# Patient Record
Sex: FEMALE | Race: WHITE | NOT HISPANIC OR LATINO | Employment: FULL TIME | URBAN - METROPOLITAN AREA
[De-identification: names, ages, dates, MRNs, and addresses within clinical notes are randomized per-mention and may not be internally consistent; named-entity substitution may affect disease eponyms.]

---

## 2017-11-21 ENCOUNTER — CONVERSION ENCOUNTER (OUTPATIENT)
Dept: GENERAL RADIOLOGY | Facility: HOSPITAL | Age: 62
End: 2017-11-21

## 2019-07-11 ENCOUNTER — HOSPITAL ENCOUNTER (OUTPATIENT)
Dept: LAB | Facility: HOSPITAL | Age: 64
Discharge: HOME | End: 2019-07-11
Attending: INTERNAL MEDICINE

## 2019-07-11 LAB
25(OH)D3 SERPL-MCNC: 27.4 NG/ML (ref 30–100)
ALBUMIN SERPL-MCNC: 4.1 G/DL (ref 3.5–5)
ALBUMIN/GLOB SERPL: 1.5 {RATIO} (ref 1.4–2.6)
ALP SERPL-CCNC: 62 U/L (ref 43–160)
ALT SERPL-CCNC: 18 U/L (ref 10–40)
ANION GAP SERPL CALC-SCNC: 14 MMOL/L (ref 8–19)
AST SERPL-CCNC: 23 U/L (ref 15–50)
BASOPHILS # BLD AUTO: 0.05 10*3/UL (ref 0–0.2)
BASOPHILS NFR BLD AUTO: 1.1 % (ref 0–3)
BILIRUB SERPL-MCNC: 0.37 MG/DL (ref 0.2–1.3)
BUN SERPL-MCNC: 21 MG/DL (ref 5–25)
BUN/CREAT SERPL: 22 {RATIO} (ref 6–20)
CALCIUM SERPL-MCNC: 9.1 MG/DL (ref 8.7–10.4)
CHLORIDE SERPL-SCNC: 101 MMOL/L (ref 99–111)
CHOLEST SERPL-MCNC: 236 MG/DL (ref 107–200)
CHOLEST/HDLC SERPL: 3.2 {RATIO} (ref 3–6)
CONV ABS IMM GRAN: 0 10*3/UL (ref 0–0.2)
CONV CO2: 27 MMOL/L (ref 22–32)
CONV IMMATURE GRAN: 0 % (ref 0–1.8)
CONV TOTAL PROTEIN: 6.8 G/DL (ref 6.3–8.2)
CREAT UR-MCNC: 0.94 MG/DL (ref 0.5–0.9)
DEPRECATED RDW RBC AUTO: 46.1 FL (ref 36.4–46.3)
EOSINOPHIL # BLD AUTO: 0.13 10*3/UL (ref 0–0.7)
EOSINOPHIL # BLD AUTO: 2.8 % (ref 0–7)
ERYTHROCYTE [DISTWIDTH] IN BLOOD BY AUTOMATED COUNT: 13.5 % (ref 11.7–14.4)
GFR SERPLBLD BASED ON 1.73 SQ M-ARVRAT: >60 ML/MIN/{1.73_M2}
GLOBULIN UR ELPH-MCNC: 2.7 G/DL (ref 2–3.5)
GLUCOSE SERPL-MCNC: 84 MG/DL (ref 65–99)
HBA1C MFR BLD: 10.9 G/DL (ref 12–16)
HCT VFR BLD AUTO: 34.5 % (ref 37–47)
HDLC SERPL-MCNC: 74 MG/DL (ref 40–60)
LDLC SERPL CALC-MCNC: 153 MG/DL (ref 70–100)
LYMPHOCYTES # BLD AUTO: 1.92 10*3/UL (ref 1–5)
MCH RBC QN AUTO: 29 PG (ref 27–31)
MCHC RBC AUTO-ENTMCNC: 31.6 G/DL (ref 33–37)
MCV RBC AUTO: 91.8 FL (ref 81–99)
MONOCYTES # BLD AUTO: 0.34 10*3/UL (ref 0.2–1.2)
MONOCYTES NFR BLD AUTO: 7.3 % (ref 3–10)
NEUTROPHILS # BLD AUTO: 2.21 10*3/UL (ref 2–8)
NEUTROPHILS NFR BLD AUTO: 47.5 % (ref 30–85)
NRBC CBCN: 0 % (ref 0–0.7)
OSMOLALITY SERPL CALC.SUM OF ELEC: 288 MOSM/KG (ref 273–304)
PLATELET # BLD AUTO: 272 10*3/UL (ref 130–400)
PMV BLD AUTO: 10.6 FL (ref 9.4–12.3)
POTASSIUM SERPL-SCNC: 4.4 MMOL/L (ref 3.5–5.3)
RBC # BLD AUTO: 3.76 10*6/UL (ref 4.2–5.4)
SODIUM SERPL-SCNC: 138 MMOL/L (ref 135–147)
T4 FREE SERPL-MCNC: 1.2 NG/DL (ref 0.9–1.8)
TRIGL SERPL-MCNC: 45 MG/DL (ref 40–150)
TSH SERPL-ACNC: 4.12 M[IU]/L (ref 0.27–4.2)
VARIANT LYMPHS NFR BLD MANUAL: 41.3 % (ref 20–45)
VLDLC SERPL-MCNC: 9 MG/DL (ref 5–37)
WBC # BLD AUTO: 4.65 10*3/UL (ref 4.8–10.8)

## 2019-09-12 ENCOUNTER — HOSPITAL ENCOUNTER (OUTPATIENT)
Dept: MAMMOGRAPHY | Facility: HOSPITAL | Age: 64
Discharge: HOME | End: 2019-09-12
Attending: INTERNAL MEDICINE

## 2019-11-11 ENCOUNTER — HOSPITAL ENCOUNTER (OUTPATIENT)
Dept: LAB | Facility: HOSPITAL | Age: 64
Discharge: HOME | End: 2019-11-11
Attending: INTERNAL MEDICINE

## 2019-11-11 LAB
ALBUMIN SERPL-MCNC: 4.2 G/DL (ref 3.5–5)
ALBUMIN/GLOB SERPL: 1.6 {RATIO} (ref 1.4–2.6)
ALP SERPL-CCNC: 58 U/L (ref 43–160)
ALT SERPL-CCNC: 22 U/L (ref 10–40)
ANION GAP SERPL CALC-SCNC: 17 MMOL/L (ref 8–19)
AST SERPL-CCNC: 24 U/L (ref 15–50)
BILIRUB SERPL-MCNC: 0.18 MG/DL (ref 0.2–1.3)
BUN SERPL-MCNC: 17 MG/DL (ref 5–25)
BUN/CREAT SERPL: 20 {RATIO} (ref 6–20)
CALCIUM SERPL-MCNC: 9.4 MG/DL (ref 8.7–10.4)
CHLORIDE SERPL-SCNC: 102 MMOL/L (ref 99–111)
CHOLEST SERPL-MCNC: 179 MG/DL (ref 107–200)
CHOLEST/HDLC SERPL: 2.3 {RATIO} (ref 3–6)
CK SERPL-CCNC: 56 U/L (ref 35–230)
CONV CO2: 25 MMOL/L (ref 22–32)
CONV TOTAL PROTEIN: 6.8 G/DL (ref 6.3–8.2)
CREAT UR-MCNC: 0.86 MG/DL (ref 0.5–0.9)
GFR SERPLBLD BASED ON 1.73 SQ M-ARVRAT: >60 ML/MIN/{1.73_M2}
GLOBULIN UR ELPH-MCNC: 2.6 G/DL (ref 2–3.5)
GLUCOSE SERPL-MCNC: 87 MG/DL (ref 65–99)
HDLC SERPL-MCNC: 77 MG/DL (ref 40–60)
LDLC SERPL CALC-MCNC: 92 MG/DL (ref 70–100)
OSMOLALITY SERPL CALC.SUM OF ELEC: 291 MOSM/KG (ref 273–304)
POTASSIUM SERPL-SCNC: 4.3 MMOL/L (ref 3.5–5.3)
SODIUM SERPL-SCNC: 140 MMOL/L (ref 135–147)
TRIGL SERPL-MCNC: 51 MG/DL (ref 40–150)
VLDLC SERPL-MCNC: 10 MG/DL (ref 5–37)

## 2020-06-06 ENCOUNTER — HOSPITAL ENCOUNTER (OUTPATIENT)
Dept: URGENT CARE | Facility: CLINIC | Age: 65
Discharge: HOME | End: 2020-06-06
Attending: PHYSICIAN ASSISTANT

## 2020-06-26 ENCOUNTER — HOSPITAL ENCOUNTER (OUTPATIENT)
Dept: LAB | Facility: HOSPITAL | Age: 65
Discharge: HOME | End: 2020-06-26
Attending: INTERNAL MEDICINE

## 2020-06-26 LAB
25(OH)D3 SERPL-MCNC: 39.3 NG/ML (ref 30–100)
ANION GAP SERPL CALC-SCNC: 12 MMOL/L (ref 8–19)
BASOPHILS # BLD AUTO: 0.06 10*3/UL (ref 0–0.2)
BASOPHILS NFR BLD AUTO: 1.3 % (ref 0–3)
BUN SERPL-MCNC: 22 MG/DL (ref 5–25)
BUN/CREAT SERPL: 24 {RATIO} (ref 6–20)
CALCIUM SERPL-MCNC: 9.9 MG/DL (ref 8.7–10.4)
CHLORIDE SERPL-SCNC: 101 MMOL/L (ref 99–111)
CONV ABS IMM GRAN: 0.01 10*3/UL (ref 0–0.2)
CONV CO2: 29 MMOL/L (ref 22–32)
CONV IMMATURE GRAN: 0.2 % (ref 0–1.8)
CREAT UR-MCNC: 0.91 MG/DL (ref 0.5–0.9)
DEPRECATED RDW RBC AUTO: 42 FL (ref 36.4–46.3)
EOSINOPHIL # BLD AUTO: 0.17 10*3/UL (ref 0–0.7)
EOSINOPHIL # BLD AUTO: 3.8 % (ref 0–7)
ERYTHROCYTE [DISTWIDTH] IN BLOOD BY AUTOMATED COUNT: 12.5 % (ref 11.7–14.4)
GFR SERPLBLD BASED ON 1.73 SQ M-ARVRAT: >60 ML/MIN/{1.73_M2}
GLUCOSE SERPL-MCNC: 94 MG/DL (ref 65–99)
HCT VFR BLD AUTO: 37.5 % (ref 37–47)
HGB BLD-MCNC: 11.9 G/DL (ref 12–16)
LYMPHOCYTES # BLD AUTO: 2.15 10*3/UL (ref 1–5)
LYMPHOCYTES NFR BLD AUTO: 47.8 % (ref 20–45)
MCH RBC QN AUTO: 29.2 PG (ref 27–31)
MCHC RBC AUTO-ENTMCNC: 31.7 G/DL (ref 33–37)
MCV RBC AUTO: 92.1 FL (ref 81–99)
MONOCYTES # BLD AUTO: 0.34 10*3/UL (ref 0.2–1.2)
MONOCYTES NFR BLD AUTO: 7.6 % (ref 3–10)
NEUTROPHILS # BLD AUTO: 1.77 10*3/UL (ref 2–8)
NEUTROPHILS NFR BLD AUTO: 39.3 % (ref 30–85)
NRBC CBCN: 0 % (ref 0–0.7)
OSMOLALITY SERPL CALC.SUM OF ELEC: 287 MOSM/KG (ref 273–304)
PLATELET # BLD AUTO: 279 10*3/UL (ref 130–400)
PMV BLD AUTO: 10.7 FL (ref 9.4–12.3)
POTASSIUM SERPL-SCNC: 4.7 MMOL/L (ref 3.5–5.3)
RBC # BLD AUTO: 4.07 10*6/UL (ref 4.2–5.4)
SODIUM SERPL-SCNC: 137 MMOL/L (ref 135–147)
T4 FREE SERPL-MCNC: 1.1 NG/DL (ref 0.9–1.8)
TSH SERPL-ACNC: 3.24 M[IU]/L (ref 0.27–4.2)
WBC # BLD AUTO: 4.5 10*3/UL (ref 4.8–10.8)

## 2020-07-20 ENCOUNTER — OFFICE VISIT CONVERTED (OUTPATIENT)
Dept: ORTHOPEDIC SURGERY | Facility: CLINIC | Age: 65
End: 2020-07-20
Attending: ORTHOPAEDIC SURGERY

## 2020-08-07 ENCOUNTER — HOSPITAL ENCOUNTER (OUTPATIENT)
Dept: MRI IMAGING | Facility: HOSPITAL | Age: 65
Discharge: HOME | End: 2020-08-07
Attending: ORTHOPAEDIC SURGERY

## 2020-08-14 ENCOUNTER — OFFICE VISIT CONVERTED (OUTPATIENT)
Dept: ORTHOPEDIC SURGERY | Facility: CLINIC | Age: 65
End: 2020-08-14
Attending: ORTHOPAEDIC SURGERY

## 2020-09-15 ENCOUNTER — HOSPITAL ENCOUNTER (OUTPATIENT)
Dept: MAMMOGRAPHY | Facility: HOSPITAL | Age: 65
Discharge: HOME | End: 2020-09-15
Attending: INTERNAL MEDICINE

## 2020-11-04 ENCOUNTER — HOSPITAL ENCOUNTER (OUTPATIENT)
Dept: OTHER | Facility: HOSPITAL | Age: 65
Discharge: HOME | End: 2020-11-04
Attending: NURSE PRACTITIONER

## 2020-11-06 LAB
HBV SURFACE AB SER QL: NON REACTIVE
MEV IGG SER IA-ACNC: >300 AU/ML

## 2020-11-07 LAB — MEV IGG SER IA-ACNC: >300 AU/ML

## 2020-12-28 ENCOUNTER — HOSPITAL ENCOUNTER (OUTPATIENT)
Dept: URGENT CARE | Facility: CLINIC | Age: 65
Discharge: HOME | End: 2020-12-28
Attending: PHYSICIAN ASSISTANT

## 2020-12-30 ENCOUNTER — HOSPITAL ENCOUNTER (OUTPATIENT)
Dept: LAB | Facility: HOSPITAL | Age: 65
Discharge: HOME | End: 2020-12-30
Attending: INTERNAL MEDICINE

## 2020-12-30 LAB
ALBUMIN SERPL-MCNC: 4.5 G/DL (ref 3.5–5)
ALBUMIN/GLOB SERPL: 1.7 {RATIO} (ref 1.4–2.6)
ALP SERPL-CCNC: 66 U/L (ref 43–160)
ALT SERPL-CCNC: 18 U/L (ref 10–40)
ANION GAP SERPL CALC-SCNC: 17 MMOL/L (ref 8–19)
AST SERPL-CCNC: 23 U/L (ref 15–50)
BASOPHILS # BLD AUTO: 0.05 10*3/UL (ref 0–0.2)
BASOPHILS NFR BLD AUTO: 1.1 % (ref 0–3)
BILIRUB SERPL-MCNC: 0.32 MG/DL (ref 0.2–1.3)
BUN SERPL-MCNC: 13 MG/DL (ref 5–25)
BUN/CREAT SERPL: 15 {RATIO} (ref 6–20)
CALCIUM SERPL-MCNC: 9.6 MG/DL (ref 8.7–10.4)
CHLORIDE SERPL-SCNC: 101 MMOL/L (ref 99–111)
CHOLEST SERPL-MCNC: 228 MG/DL (ref 107–200)
CHOLEST/HDLC SERPL: 3.3 {RATIO} (ref 3–6)
CONV ABS IMM GRAN: 0.01 10*3/UL (ref 0–0.2)
CONV CO2: 25 MMOL/L (ref 22–32)
CONV IMMATURE GRAN: 0.2 % (ref 0–1.8)
CONV TOTAL PROTEIN: 7.1 G/DL (ref 6.3–8.2)
CREAT UR-MCNC: 0.88 MG/DL (ref 0.5–0.9)
DEPRECATED RDW RBC AUTO: 42.9 FL (ref 36.4–46.3)
EOSINOPHIL # BLD AUTO: 0.11 10*3/UL (ref 0–0.7)
EOSINOPHIL # BLD AUTO: 2.4 % (ref 0–7)
ERYTHROCYTE [DISTWIDTH] IN BLOOD BY AUTOMATED COUNT: 12.7 % (ref 11.7–14.4)
GFR SERPLBLD BASED ON 1.73 SQ M-ARVRAT: >60 ML/MIN/{1.73_M2}
GLOBULIN UR ELPH-MCNC: 2.6 G/DL (ref 2–3.5)
GLUCOSE SERPL-MCNC: 94 MG/DL (ref 65–99)
HCT VFR BLD AUTO: 38.9 % (ref 37–47)
HDLC SERPL-MCNC: 70 MG/DL (ref 40–60)
HGB BLD-MCNC: 12.3 G/DL (ref 12–16)
LDLC SERPL CALC-MCNC: 133 MG/DL (ref 70–100)
LYMPHOCYTES # BLD AUTO: 1.43 10*3/UL (ref 1–5)
LYMPHOCYTES NFR BLD AUTO: 31.6 % (ref 20–45)
MCH RBC QN AUTO: 29.1 PG (ref 27–31)
MCHC RBC AUTO-ENTMCNC: 31.6 G/DL (ref 33–37)
MCV RBC AUTO: 92.2 FL (ref 81–99)
MONOCYTES # BLD AUTO: 0.29 10*3/UL (ref 0.2–1.2)
MONOCYTES NFR BLD AUTO: 6.4 % (ref 3–10)
NEUTROPHILS # BLD AUTO: 2.63 10*3/UL (ref 2–8)
NEUTROPHILS NFR BLD AUTO: 58.3 % (ref 30–85)
NRBC CBCN: 0 % (ref 0–0.7)
OSMOLALITY SERPL CALC.SUM OF ELEC: 288 MOSM/KG (ref 273–304)
PLATELET # BLD AUTO: 266 10*3/UL (ref 130–400)
PMV BLD AUTO: 10.8 FL (ref 9.4–12.3)
POTASSIUM SERPL-SCNC: 4.2 MMOL/L (ref 3.5–5.3)
RBC # BLD AUTO: 4.22 10*6/UL (ref 4.2–5.4)
SODIUM SERPL-SCNC: 139 MMOL/L (ref 135–147)
TRIGL SERPL-MCNC: 126 MG/DL (ref 40–150)
VLDLC SERPL-MCNC: 25 MG/DL (ref 5–37)
WBC # BLD AUTO: 4.52 10*3/UL (ref 4.8–10.8)

## 2021-02-25 ENCOUNTER — HOSPITAL ENCOUNTER (OUTPATIENT)
Dept: OTHER | Facility: HOSPITAL | Age: 66
Discharge: HOME | End: 2021-02-25

## 2021-02-25 LAB
ALBUMIN SERPL-MCNC: 4.3 G/DL (ref 3.5–5)
ALBUMIN/GLOB SERPL: 1.5 {RATIO} (ref 1.4–2.6)
ALP SERPL-CCNC: 79 U/L (ref 43–160)
ALT SERPL-CCNC: 14 U/L (ref 10–40)
ANION GAP SERPL CALC-SCNC: 11 MMOL/L (ref 8–19)
AST SERPL-CCNC: 21 U/L (ref 15–50)
BASOPHILS # BLD AUTO: 0.02 10*3/UL (ref 0–0.2)
BASOPHILS NFR BLD AUTO: 0.5 % (ref 0–3)
BILIRUB SERPL-MCNC: 0.29 MG/DL (ref 0.2–1.3)
BUN SERPL-MCNC: 13 MG/DL (ref 5–25)
BUN/CREAT SERPL: 14 {RATIO} (ref 6–20)
CALCIUM SERPL-MCNC: 8.9 MG/DL (ref 8.7–10.4)
CHLORIDE SERPL-SCNC: 100 MMOL/L (ref 99–111)
CHOLEST SERPL-MCNC: 211 MG/DL (ref 107–200)
CHOLEST/HDLC SERPL: 3.6 {RATIO} (ref 3–6)
CONV ABS IMM GRAN: 0 10*3/UL (ref 0–0.2)
CONV CO2: 26 MMOL/L (ref 22–32)
CONV IMMATURE GRAN: 0 % (ref 0–1.8)
CONV TOTAL PROTEIN: 7.2 G/DL (ref 6.3–8.2)
CREAT UR-MCNC: 0.91 MG/DL (ref 0.5–0.9)
DEPRECATED RDW RBC AUTO: 45.2 FL (ref 36.4–46.3)
EOSINOPHIL # BLD AUTO: 0.01 10*3/UL (ref 0–0.7)
EOSINOPHIL # BLD AUTO: 0.2 % (ref 0–7)
ERYTHROCYTE [DISTWIDTH] IN BLOOD BY AUTOMATED COUNT: 13.4 % (ref 11.7–14.4)
GFR SERPLBLD BASED ON 1.73 SQ M-ARVRAT: >60 ML/MIN/{1.73_M2}
GLOBULIN UR ELPH-MCNC: 2.9 G/DL (ref 2–3.5)
GLUCOSE SERPL-MCNC: 93 MG/DL (ref 65–99)
HCT VFR BLD AUTO: 38.8 % (ref 37–47)
HDLC SERPL-MCNC: 59 MG/DL (ref 40–60)
HGB BLD-MCNC: 12.5 G/DL (ref 12–16)
LDLC SERPL CALC-MCNC: 136 MG/DL (ref 70–100)
LYMPHOCYTES # BLD AUTO: 1.74 10*3/UL (ref 1–5)
LYMPHOCYTES NFR BLD AUTO: 42.6 % (ref 20–45)
MCH RBC QN AUTO: 29.3 PG (ref 27–31)
MCHC RBC AUTO-ENTMCNC: 32.2 G/DL (ref 33–37)
MCV RBC AUTO: 91.1 FL (ref 81–99)
MONOCYTES # BLD AUTO: 0.37 10*3/UL (ref 0.2–1.2)
MONOCYTES NFR BLD AUTO: 9.1 % (ref 3–10)
NEUTROPHILS # BLD AUTO: 1.94 10*3/UL (ref 2–8)
NEUTROPHILS NFR BLD AUTO: 47.6 % (ref 30–85)
NRBC CBCN: 0 % (ref 0–0.7)
OSMOLALITY SERPL CALC.SUM OF ELEC: 276 MOSM/KG (ref 273–304)
PLATELET # BLD AUTO: 191 10*3/UL (ref 130–400)
PMV BLD AUTO: 9.7 FL (ref 9.4–12.3)
POTASSIUM SERPL-SCNC: 4.1 MMOL/L (ref 3.5–5.3)
RBC # BLD AUTO: 4.26 10*6/UL (ref 4.2–5.4)
SODIUM SERPL-SCNC: 133 MMOL/L (ref 135–147)
TRIGL SERPL-MCNC: 78 MG/DL (ref 40–150)
TSH SERPL-ACNC: 2.88 M[IU]/L (ref 0.27–4.2)
VLDLC SERPL-MCNC: 16 MG/DL (ref 5–37)
WBC # BLD AUTO: 4.08 10*3/UL (ref 4.8–10.8)

## 2021-05-10 NOTE — H&P
History and Physical      Patient Name: Giselle Garcia   Patient ID: [id number]   Sex: Female   YOB: 1955    Referring Provider: Juno Jerome MD    Visit Date: July 20, 2020    Provider: Sebastián Gonzalez MD   Location: [facility]   Location Address: [address] [place] [address]  [id number]   Location Phone: [phone]          Chief Complaint  · Left knee pain      History Of Present Illness  Giselle Garcia is a 65 year old /White female who presents today to [facility]. Patient is here for left knee pain that started 3 months ago. No known injury or trauma. 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 She states her pain waxes and wanes. Patient states the pain is on the medial side.       Past Medical History  *No Pertinent Past Medical History; Limb Swelling         Past Surgical History  Colonoscopy; Tubal ligation         Medication List  Celexa oral; cetirizine oral; Imitrex 100 mg oral tablet; Lipitor oral; MoviPrep 100-7.5-2.691 gram oral powder in packet; multivitamin oral tablet; Vitamin B-12 500 mcg oral tablet; Vitamin D3 1,000 unit oral capsule         Allergy List  NO KNOWN DRUG ALLERGIES         Family Medical History  Heart Disease; - No Family History of Colorectal Cancer; Family history of Arthritis         Social History  Alcohol (Never); Alcohol Use (Current some day); lives with spouse; Recreational Drug Use (Never); Tobacco (Never); Working         Review of Systems  · Constitutional  o Denies  o : fever, chills, weight loss  · Cardiovascular  o Denies  o : chest pain, shortness of breath  · Gastrointestinal  o Denies  o : liver disease, heartburn, nausea, blood in stools  · Genitourinary  o Denies  o : painful urination, blood in urine  · Integument  o Denies  o : rash, itching  · Neurologic  o Denies  o : headache, weakness, loss of consciousness  · Musculoskeletal  o Denies  o : painful, swollen  "joints  · Psychiatric  o Denies  o : drug/alcohol addiction, anxiety, depression      Vitals  Date Time BP Position Site L\R Cuff Size HR RR TEMP (F) WT  HT  BMI kg/m2 BSA m2 O2 Sat        07/20/2020 09:18 AM         125lbs 0oz 5'  1\" 23.62 1.56           Physical Examination  · Constitutional  o Appearance  o : well developed, well-nourished, no obvious deformities present  · Head and Face  o Head  o :   § Inspection  § : normocephalic  o Face  o :   § Inspection  § : no facial lesions  · Eyes  o Conjunctivae  o : conjunctivae normal  o Sclerae  o : sclerae white  · Ears, Nose, Mouth and Throat  o Ears  o :   § External Ears  § : appearance within normal limits  § Hearing  § : intact  o Nose  o :   § External Nose  § : appearance normal  · Neck  o Inspection/Palpation  o : normal appearance  o Range of Motion  o : full range of motion  · Respiratory  o Respiratory Effort  o : breathing unlabored  o Inspection of Chest  o : normal appearance  o Auscultation of Lungs  o : no audible wheezing or rales  · Cardiovascular  o Heart  o : regular rate  · Gastrointestinal  o Abdominal Examination  o : soft and non-tender  · Skin and Subcutaneous Tissue  o General Inspection  o : intact, no rashes  · Psychiatric  o General  o : Alert and oriented x3  o Judgement and Insight  o : judgment and insight intact  o Mood and Affect  o : mood normal, affect appropriate  · Left Knee  o Inspection  o : No skin discoloration, atrophy or swelling. Full extension. Full flexion. No tenderness on medial and lateral joint line. Negative Lackmans. Negative valgus/varus. Calf supple, nontender. Neurovascularly intact. X-rays reviewed negative fracture or dislocation. Minimal osteoarthritis.           Assessment  · Left knee pain, unspecified chronicity     719.46/M25.562      Plan  · Medications  o Medications have been Reconciled  o Transition of Care or Provider Policy  · Instructions  o Reviewed the patient's Past Medical, Social, and " Family history as well as the ROS at today's visit, no changes.  o Call or return if worsening symptoms.  o This note is transcribed by Yanet rudd/carrie  o If pain increases, may consider a MRI.             Electronically Signed by: Yanet Gamez, -Author on July 21, 2020 03:49:20 PM  Electronically Co-signed by: Stella Parks PA-C -Reviewer on July 21, 2020 04:28:29 PM  Electronically Co-signed by: Sebastián Gonzalez MD -Reviewer on July 22, 2020 05:09:55 PM

## 2021-05-13 NOTE — PROGRESS NOTES
Progress Note      Patient Name: Giselle Gacria   Patient ID: [id number]   Sex: Female   YOB: 1955    Referring Provider: Juno Jerome MD    Visit Date: August 14, 2020    Provider: Sebastián Gonzalez MD   Location: [facility]   Location Address: [address] [place] [address]  [id number]   Location Phone: [phone]          Chief Complaint  · Follow up Left Knee Pain & MRI Results.      History Of Present Illness  Giselle Garcia is a 65 year old /White female who is following back up for the MRI results. She began having knee pain in May 2020. 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 The MRI shows a tiny possible tear in the medial meniscus posterior horn.       Past Medical History  *No Pertinent Past Medical History; Limb Swelling         Past Surgical History  Colonoscopy; Tubal ligation         Medication List  Celexa oral; cetirizine oral; Imitrex 100 mg oral tablet; Lipitor oral; MoviPrep 100-7.5-2.691 gram oral powder in packet; multivitamin oral tablet; Vitamin B-12 500 mcg oral tablet; Vitamin D3 1,000 unit oral capsule         Allergy List  NO KNOWN DRUG ALLERGIES         Family Medical History  Heart Disease; - No Family History of Colorectal Cancer; Family history of Arthritis         Social History  Alcohol (Never); Alcohol Use (Current some day); lives with spouse; Recreational Drug Use (Never); Tobacco (Never); Working         Review of Systems  · Constitutional  o Denies  o : fever, chills, weight loss  · Cardiovascular  o Denies  o : chest pain, shortness of breath  · Gastrointestinal  o Denies  o : liver disease, heartburn, nausea, blood in stools  · Genitourinary  o Denies  o : painful urination, blood in urine  · Integument  o Denies  o : rash, itching  · Neurologic  o Denies  o : headache, weakness, loss of consciousness  · Musculoskeletal  o Admits  o : painful, swollen joints  · Psychiatric  o Denies  o : drug/alcohol  "addiction, anxiety, depression      Vitals  Date Time BP Position Site L\R Cuff Size HR RR TEMP (F) WT  HT  BMI kg/m2 BSA m2 O2 Sat HC       08/14/2020 12:39 PM         125lbs 0oz 5'  1\" 23.62 1.56           Physical Examination  · Constitutional  o Appearance  o : well developed, well-nourished, no obvious deformities present  · Head and Face  o Head  o :   § Inspection  § : normocephalic  o Face  o :   § Inspection  § : no facial lesions  · Eyes  o Conjunctivae  o : conjunctivae normal  o Sclerae  o : sclerae white  · Ears, Nose, Mouth and Throat  o Ears  o :   § External Ears  § : appearance within normal limits  § Hearing  § : intact  o Nose  o :   § External Nose  § : appearance normal  · Neck  o Inspection/Palpation  o : normal appearance  o Range of Motion  o : full range of motion  · Respiratory  o Respiratory Effort  o : breathing unlabored  o Inspection of Chest  o : normal appearance  o Auscultation of Lungs  o : no audible wheezing or rales  · Cardiovascular  o Heart  o : regular rate  · Gastrointestinal  o Abdominal Examination  o : soft and non-tender  · Skin and Subcutaneous Tissue  o General Inspection  o : intact, no rashes  · Psychiatric  o General  o : Alert and oriented x3  o Judgement and Insight  o : judgment and insight intact  o Mood and Affect  o : mood normal, affect appropriate  · Left Knee  o Inspection  o : No effusion. Tenderness over posteromedial hamstrings and adductor tubercle. Minimally tender on the medial joint line. Negative Erlinda's. Negative Apley's. Full range of motion.   · Imaging  o Imaging  o : MRI performed at [facility] on 08/07/2020 revealed: 1) Tiny vertically-oriented radial tear in the posterior horn of medial meniscus; 2) Mild patellar chondromalacia. Abnormal signal in the quadriceps fat pad may be due to patellar tracking abnormality.           Assessment  · Left knee posteromedial hamstring pain     719.46/M25.562      Plan  · Medications  o Medications have been " Reconciled  o Transition of Care or Provider Policy  · Instructions  o Reviewed the patient's Past Medical, Social, and Family history as well as the ROS at today's visit, no changes.  o Call or return if worsening symptoms.  o This note was transcribed by Beronica butler.  o Proceed with physical therapy first to further evaluate and educate on home stretching exercise program. Physical therapy at [facility]. Follow up in 5-6 weeks. Consider possible injection if failing to improve.             Electronically Signed by: Beronica Gruber-, Other -Author on August 15, 2020 01:08:07 PM  Electronically Co-signed by: Sebastián Gonzalez MD -Reviewer on August 17, 2020 05:47:26 PM

## 2021-05-15 VITALS — BODY MASS INDEX: 23.6 KG/M2 | WEIGHT: 125 LBS | HEIGHT: 61 IN

## 2021-05-15 VITALS — HEIGHT: 61 IN | WEIGHT: 125 LBS | BODY MASS INDEX: 23.6 KG/M2

## 2021-06-14 ENCOUNTER — TRANSCRIBE ORDERS (OUTPATIENT)
Dept: LAB | Facility: HOSPITAL | Age: 66
End: 2021-06-14

## 2021-06-14 ENCOUNTER — LAB (OUTPATIENT)
Dept: LAB | Facility: HOSPITAL | Age: 66
End: 2021-06-14

## 2021-06-14 DIAGNOSIS — E55.9 VITAMIN D DEFICIENCY: ICD-10-CM

## 2021-06-14 DIAGNOSIS — R73.01 IMPAIRED FASTING GLUCOSE: ICD-10-CM

## 2021-06-14 DIAGNOSIS — D72.819 LEUKOPENIA, UNSPECIFIED TYPE: Primary | ICD-10-CM

## 2021-06-14 DIAGNOSIS — E03.4 IDIOPATHIC ATROPHIC HYPOTHYROIDISM: ICD-10-CM

## 2021-06-14 DIAGNOSIS — D72.819 LEUKOPENIA, UNSPECIFIED TYPE: ICD-10-CM

## 2021-06-14 DIAGNOSIS — D50.9 IRON DEFICIENCY ANEMIA, UNSPECIFIED IRON DEFICIENCY ANEMIA TYPE: ICD-10-CM

## 2021-06-14 DIAGNOSIS — E78.2 MIXED HYPERLIPIDEMIA: ICD-10-CM

## 2021-06-14 LAB
25(OH)D3 SERPL-MCNC: 37.6 NG/ML
ALBUMIN SERPL-MCNC: 4.3 G/DL (ref 3.5–5.2)
ALBUMIN/GLOB SERPL: 2.4 G/DL
ALP SERPL-CCNC: 57 U/L (ref 39–117)
ALT SERPL W P-5'-P-CCNC: 18 U/L (ref 1–33)
ANION GAP SERPL CALCULATED.3IONS-SCNC: 10.1 MMOL/L (ref 5–15)
AST SERPL-CCNC: 21 U/L (ref 1–32)
BASOPHILS # BLD AUTO: 0.04 10*3/MM3 (ref 0–0.2)
BASOPHILS NFR BLD AUTO: 0.7 % (ref 0–1.5)
BILIRUB SERPL-MCNC: <0.2 MG/DL (ref 0–1.2)
BUN SERPL-MCNC: 12 MG/DL (ref 8–23)
BUN/CREAT SERPL: 12.1 (ref 7–25)
CALCIUM SPEC-SCNC: 9.7 MG/DL (ref 8.6–10.5)
CHLORIDE SERPL-SCNC: 104 MMOL/L (ref 98–107)
CHOLEST SERPL-MCNC: 235 MG/DL (ref 0–200)
CO2 SERPL-SCNC: 26.9 MMOL/L (ref 22–29)
CREAT SERPL-MCNC: 0.99 MG/DL (ref 0.57–1)
DEPRECATED RDW RBC AUTO: 40.3 FL (ref 37–54)
EOSINOPHIL # BLD AUTO: 0.09 10*3/MM3 (ref 0–0.4)
EOSINOPHIL NFR BLD AUTO: 1.7 % (ref 0.3–6.2)
ERYTHROCYTE [DISTWIDTH] IN BLOOD BY AUTOMATED COUNT: 12.2 % (ref 12.3–15.4)
FERRITIN SERPL-MCNC: 56.6 NG/ML (ref 13–150)
GFR SERPL CREATININE-BSD FRML MDRD: 56 ML/MIN/1.73
GLOBULIN UR ELPH-MCNC: 1.8 GM/DL
GLUCOSE SERPL-MCNC: 86 MG/DL (ref 65–99)
HCT VFR BLD AUTO: 34.5 % (ref 34–46.6)
HDLC SERPL-MCNC: 72 MG/DL (ref 40–60)
HGB BLD-MCNC: 11.7 G/DL (ref 12–15.9)
IMM GRANULOCYTES # BLD AUTO: 0.01 10*3/MM3 (ref 0–0.05)
IMM GRANULOCYTES NFR BLD AUTO: 0.2 % (ref 0–0.5)
IRON 24H UR-MRATE: 86 MCG/DL (ref 37–145)
IRON SATN MFR SERPL: 23 % (ref 20–50)
LDLC SERPL CALC-MCNC: 149 MG/DL (ref 0–100)
LDLC/HDLC SERPL: 2.04 {RATIO}
LYMPHOCYTES # BLD AUTO: 1.82 10*3/MM3 (ref 0.7–3.1)
LYMPHOCYTES NFR BLD AUTO: 34 % (ref 19.6–45.3)
MCH RBC QN AUTO: 30.4 PG (ref 26.6–33)
MCHC RBC AUTO-ENTMCNC: 33.9 G/DL (ref 31.5–35.7)
MCV RBC AUTO: 89.6 FL (ref 79–97)
MONOCYTES # BLD AUTO: 0.35 10*3/MM3 (ref 0.1–0.9)
MONOCYTES NFR BLD AUTO: 6.5 % (ref 5–12)
NEUTROPHILS NFR BLD AUTO: 3.04 10*3/MM3 (ref 1.7–7)
NEUTROPHILS NFR BLD AUTO: 56.9 % (ref 42.7–76)
NRBC BLD AUTO-RTO: 0 /100 WBC (ref 0–0.2)
PLATELET # BLD AUTO: 266 10*3/MM3 (ref 140–450)
PMV BLD AUTO: 10.6 FL (ref 6–12)
POTASSIUM SERPL-SCNC: 4.3 MMOL/L (ref 3.5–5.2)
PROT SERPL-MCNC: 6.1 G/DL (ref 6–8.5)
RBC # BLD AUTO: 3.85 10*6/MM3 (ref 3.77–5.28)
SODIUM SERPL-SCNC: 141 MMOL/L (ref 136–145)
T4 FREE SERPL-MCNC: 0.98 NG/DL (ref 0.93–1.7)
TIBC SERPL-MCNC: 374 MCG/DL (ref 298–536)
TRANSFERRIN SERPL-MCNC: 251 MG/DL (ref 200–360)
TRIGL SERPL-MCNC: 79 MG/DL (ref 0–150)
TSH SERPL DL<=0.05 MIU/L-ACNC: 2.9 UIU/ML (ref 0.27–4.2)
VLDLC SERPL-MCNC: 14 MG/DL (ref 5–40)
WBC # BLD AUTO: 5.35 10*3/MM3 (ref 3.4–10.8)

## 2021-06-14 PROCEDURE — 80061 LIPID PANEL: CPT

## 2021-06-14 PROCEDURE — 85025 COMPLETE CBC W/AUTO DIFF WBC: CPT

## 2021-06-14 PROCEDURE — 82728 ASSAY OF FERRITIN: CPT

## 2021-06-14 PROCEDURE — 84466 ASSAY OF TRANSFERRIN: CPT

## 2021-06-14 PROCEDURE — 36415 COLL VENOUS BLD VENIPUNCTURE: CPT

## 2021-06-14 PROCEDURE — 84443 ASSAY THYROID STIM HORMONE: CPT

## 2021-06-14 PROCEDURE — 84439 ASSAY OF FREE THYROXINE: CPT

## 2021-06-14 PROCEDURE — 83540 ASSAY OF IRON: CPT

## 2021-06-14 PROCEDURE — 82306 VITAMIN D 25 HYDROXY: CPT

## 2021-06-14 PROCEDURE — 80053 COMPREHEN METABOLIC PANEL: CPT

## 2021-12-03 RX ORDER — CITALOPRAM 40 MG/1
TABLET ORAL
COMMUNITY
End: 2021-12-03 | Stop reason: SDUPTHER

## 2021-12-03 RX ORDER — SUMATRIPTAN 100 MG/1
TABLET, FILM COATED ORAL
Qty: 20 TABLET | Refills: 0 | Status: SHIPPED | OUTPATIENT
Start: 2021-12-03

## 2021-12-03 RX ORDER — CITALOPRAM 40 MG/1
40 TABLET ORAL DAILY
Qty: 90 TABLET | Refills: 1 | Status: SHIPPED | OUTPATIENT
Start: 2021-12-03 | End: 2022-04-27

## 2021-12-03 RX ORDER — SUMATRIPTAN 100 MG/1
TABLET, FILM COATED ORAL
COMMUNITY
End: 2021-12-03 | Stop reason: SDUPTHER

## 2021-12-20 PROBLEM — E78.2 MIXED HYPERLIPIDEMIA: Status: ACTIVE | Noted: 2021-12-20

## 2021-12-20 PROBLEM — F33.41 RECURRENT MAJOR DEPRESSION IN PARTIAL REMISSION (HCC): Status: ACTIVE | Noted: 2021-12-20

## 2021-12-20 PROBLEM — M15.0 PRIMARY OSTEOARTHRITIS INVOLVING MULTIPLE JOINTS: Status: ACTIVE | Noted: 2021-12-20

## 2021-12-20 PROBLEM — E55.9 VITAMIN D DEFICIENCY: Status: ACTIVE | Noted: 2021-12-20

## 2021-12-20 PROBLEM — M15.9 PRIMARY OSTEOARTHRITIS INVOLVING MULTIPLE JOINTS: Status: ACTIVE | Noted: 2021-12-20

## 2021-12-20 PROBLEM — M85.89 OSTEOPENIA OF MULTIPLE SITES: Status: ACTIVE | Noted: 2021-12-20

## 2021-12-20 PROBLEM — D72.819 LEUKOPENIA: Status: ACTIVE | Noted: 2021-12-20

## 2022-01-16 ENCOUNTER — APPOINTMENT (OUTPATIENT)
Dept: GENERAL RADIOLOGY | Facility: HOSPITAL | Age: 67
End: 2022-01-16

## 2022-01-16 ENCOUNTER — HOSPITAL ENCOUNTER (EMERGENCY)
Facility: HOSPITAL | Age: 67
Discharge: HOME | End: 2022-01-16
Attending: EMERGENCY MEDICINE | Admitting: EMERGENCY MEDICINE

## 2022-01-16 VITALS
OXYGEN SATURATION: 98 % | BODY MASS INDEX: 22.43 KG/M2 | HEART RATE: 79 BPM | WEIGHT: 118.83 LBS | TEMPERATURE: 97.5 F | RESPIRATION RATE: 18 BRPM | DIASTOLIC BLOOD PRESSURE: 75 MMHG | SYSTOLIC BLOOD PRESSURE: 122 MMHG | HEIGHT: 61 IN

## 2022-01-16 DIAGNOSIS — K59.00 CONSTIPATION, UNSPECIFIED CONSTIPATION TYPE: Primary | ICD-10-CM

## 2022-01-16 DIAGNOSIS — R31.9 HEMATURIA, UNSPECIFIED TYPE: ICD-10-CM

## 2022-01-16 LAB
ALBUMIN SERPL-MCNC: 5 G/DL (ref 3.5–5.2)
ALBUMIN/GLOB SERPL: 1.7 G/DL
ALP SERPL-CCNC: 99 U/L (ref 39–117)
ALT SERPL W P-5'-P-CCNC: 17 U/L (ref 1–33)
ANION GAP SERPL CALCULATED.3IONS-SCNC: 15.4 MMOL/L (ref 5–15)
AST SERPL-CCNC: 20 U/L (ref 1–32)
BACTERIA UR QL AUTO: ABNORMAL /HPF
BASOPHILS # BLD AUTO: 0.05 10*3/MM3 (ref 0–0.2)
BASOPHILS NFR BLD AUTO: 0.7 % (ref 0–1.5)
BILIRUB SERPL-MCNC: 0.8 MG/DL (ref 0–1.2)
BILIRUB UR QL STRIP: NEGATIVE
BUN SERPL-MCNC: 17 MG/DL (ref 8–23)
BUN/CREAT SERPL: 17 (ref 7–25)
CALCIUM SPEC-SCNC: 10.3 MG/DL (ref 8.6–10.5)
CHLORIDE SERPL-SCNC: 98 MMOL/L (ref 98–107)
CLARITY UR: CLEAR
CO2 SERPL-SCNC: 24.6 MMOL/L (ref 22–29)
COLOR UR: YELLOW
CREAT SERPL-MCNC: 1 MG/DL (ref 0.57–1)
DEPRECATED RDW RBC AUTO: 42.2 FL (ref 37–54)
EOSINOPHIL # BLD AUTO: 0.07 10*3/MM3 (ref 0–0.4)
EOSINOPHIL NFR BLD AUTO: 1 % (ref 0.3–6.2)
ERYTHROCYTE [DISTWIDTH] IN BLOOD BY AUTOMATED COUNT: 12.7 % (ref 12.3–15.4)
GFR SERPL CREATININE-BSD FRML MDRD: 55 ML/MIN/1.73
GLOBULIN UR ELPH-MCNC: 3 GM/DL
GLUCOSE SERPL-MCNC: 100 MG/DL (ref 65–99)
GLUCOSE UR STRIP-MCNC: NEGATIVE MG/DL
HCT VFR BLD AUTO: 39 % (ref 34–46.6)
HGB BLD-MCNC: 12.9 G/DL (ref 12–15.9)
HGB UR QL STRIP.AUTO: ABNORMAL
HOLD SPECIMEN: NORMAL
HOLD SPECIMEN: NORMAL
HYALINE CASTS UR QL AUTO: ABNORMAL /LPF
IMM GRANULOCYTES # BLD AUTO: 0.02 10*3/MM3 (ref 0–0.05)
IMM GRANULOCYTES NFR BLD AUTO: 0.3 % (ref 0–0.5)
KETONES UR QL STRIP: ABNORMAL
LEUKOCYTE ESTERASE UR QL STRIP.AUTO: ABNORMAL
LIPASE SERPL-CCNC: 38 U/L (ref 13–60)
LYMPHOCYTES # BLD AUTO: 1.93 10*3/MM3 (ref 0.7–3.1)
LYMPHOCYTES NFR BLD AUTO: 28.2 % (ref 19.6–45.3)
MCH RBC QN AUTO: 29.9 PG (ref 26.6–33)
MCHC RBC AUTO-ENTMCNC: 33.1 G/DL (ref 31.5–35.7)
MCV RBC AUTO: 90.5 FL (ref 79–97)
MONOCYTES # BLD AUTO: 0.48 10*3/MM3 (ref 0.1–0.9)
MONOCYTES NFR BLD AUTO: 7 % (ref 5–12)
NEUTROPHILS NFR BLD AUTO: 4.29 10*3/MM3 (ref 1.7–7)
NEUTROPHILS NFR BLD AUTO: 62.8 % (ref 42.7–76)
NITRITE UR QL STRIP: NEGATIVE
NRBC BLD AUTO-RTO: 0 /100 WBC (ref 0–0.2)
PH UR STRIP.AUTO: 5.5 [PH] (ref 5–8)
PLATELET # BLD AUTO: 290 10*3/MM3 (ref 140–450)
PMV BLD AUTO: 10.2 FL (ref 6–12)
POTASSIUM SERPL-SCNC: 3.9 MMOL/L (ref 3.5–5.2)
PROT SERPL-MCNC: 8 G/DL (ref 6–8.5)
PROT UR QL STRIP: NEGATIVE
RBC # BLD AUTO: 4.31 10*6/MM3 (ref 3.77–5.28)
RBC # UR STRIP: ABNORMAL /HPF
REF LAB TEST METHOD: ABNORMAL
SODIUM SERPL-SCNC: 138 MMOL/L (ref 136–145)
SP GR UR STRIP: 1.02 (ref 1–1.03)
SQUAMOUS #/AREA URNS HPF: ABNORMAL /HPF
UROBILINOGEN UR QL STRIP: ABNORMAL
WBC # UR STRIP: ABNORMAL /HPF
WBC NRBC COR # BLD: 6.84 10*3/MM3 (ref 3.4–10.8)
WHOLE BLOOD HOLD SPECIMEN: NORMAL
WHOLE BLOOD HOLD SPECIMEN: NORMAL

## 2022-01-16 PROCEDURE — 87086 URINE CULTURE/COLONY COUNT: CPT | Performed by: NURSE PRACTITIONER

## 2022-01-16 PROCEDURE — 81001 URINALYSIS AUTO W/SCOPE: CPT | Performed by: EMERGENCY MEDICINE

## 2022-01-16 PROCEDURE — 85025 COMPLETE CBC W/AUTO DIFF WBC: CPT

## 2022-01-16 PROCEDURE — 36415 COLL VENOUS BLD VENIPUNCTURE: CPT

## 2022-01-16 PROCEDURE — 74019 RADEX ABDOMEN 2 VIEWS: CPT

## 2022-01-16 PROCEDURE — 80053 COMPREHEN METABOLIC PANEL: CPT | Performed by: EMERGENCY MEDICINE

## 2022-01-16 PROCEDURE — 99283 EMERGENCY DEPT VISIT LOW MDM: CPT

## 2022-01-16 PROCEDURE — 83690 ASSAY OF LIPASE: CPT | Performed by: EMERGENCY MEDICINE

## 2022-01-16 RX ORDER — SODIUM CHLORIDE 0.9 % (FLUSH) 0.9 %
10 SYRINGE (ML) INJECTION AS NEEDED
Status: DISCONTINUED | OUTPATIENT
Start: 2022-01-16 | End: 2022-01-16 | Stop reason: HOSPADM

## 2022-01-16 NOTE — ED PROVIDER NOTES
Subjective   Patient reports that she has been having low abdominal pain since early Friday along with constipation.  She states that the pain worsens after she eats.  She does report that she has had constipation and had to disimpact herself about a week ago.  She says since that time she has been having, shaped stools and small balls.  She also reports GERD and reflux.  She went to a care center today and was advised to come to the emergency department to rule out a possible impaction.  She also reports that around 2 AM this morning she randomly vomited.        History provided by:  Patient   used: No        Review of Systems   Constitutional: Negative for chills and fever.   HENT: Negative for congestion, ear pain, rhinorrhea and sore throat.    Eyes: Negative for pain.   Respiratory: Negative for cough and shortness of breath.    Cardiovascular: Negative for chest pain.   Gastrointestinal: Positive for abdominal pain (Across lower abdomen), constipation and vomiting (One episode). Negative for diarrhea and nausea.   Genitourinary: Negative for decreased urine volume, dysuria and flank pain.   Musculoskeletal: Negative for arthralgias and myalgias.   Skin: Negative for rash.   Neurological: Negative for seizures and headaches.   All other systems reviewed and are negative.      Past Medical History:   Diagnosis Date   • Allergic rhinitis due to pollen    • Atrophy of thyroid (acquired)    • Decreased white blood cell count, unspecified    • History of colon polyps    • History of migraine headaches    • Impaired fasting glucose    • Iron deficiency anemia, unspecified    • Leukopenia    • Migraine without aura, intractable, without status migrainosus    • Mixed hyperlipidemia    • Mixed hyperlipidemia    • Other fatigue    • Other specified disorders of white blood cells    • Panic disorder without agoraphobia    • Polyp of colon    • Pure hypercholesterolemia    • Unspecified osteoarthritis,  unspecified site    • Vitamin D deficiency, unspecified        No Known Allergies    Past Surgical History:   Procedure Laterality Date   • TUBAL ABDOMINAL LIGATION Bilateral        Family History   Problem Relation Age of Onset   • No Known Problems Mother    • Skin cancer Father    • Hypertension Father    • No Known Problems Sister    • Colon cancer Brother        Social History     Socioeconomic History   • Marital status:            Objective   Physical Exam  Vitals and nursing note reviewed.   Constitutional:       General: She is not in acute distress.     Appearance: Normal appearance. She is well-developed and normal weight. She is not ill-appearing, toxic-appearing or diaphoretic.   HENT:      Head: Normocephalic and atraumatic.      Right Ear: External ear normal.      Left Ear: External ear normal.   Eyes:      General: No scleral icterus.     Conjunctiva/sclera: Conjunctivae normal.      Pupils: Pupils are equal, round, and reactive to light.   Cardiovascular:      Rate and Rhythm: Normal rate and regular rhythm.      Heart sounds: Normal heart sounds.   Pulmonary:      Effort: Pulmonary effort is normal. No respiratory distress.      Breath sounds: Normal breath sounds.   Abdominal:      General: Abdomen is flat. Bowel sounds are normal. There is no distension.      Palpations: Abdomen is soft.      Tenderness: There is abdominal tenderness in the right lower quadrant, suprapubic area and left lower quadrant. There is no guarding.   Musculoskeletal:         General: No swelling, tenderness, deformity or signs of injury. Normal range of motion.      Cervical back: Normal range of motion and neck supple.   Skin:     General: Skin is warm and dry.      Capillary Refill: Capillary refill takes less than 2 seconds.   Neurological:      General: No focal deficit present.      Mental Status: She is alert and oriented to person, place, and time.   Psychiatric:         Mood and Affect: Mood normal.          Behavior: Behavior normal.         Procedures           ED Course  ED Course as of 01/17/22 0233 Mon Jan 17, 2022   0231 Spoke with chemistry who advises that the patient's urine has been sent for culture. Patient will be notified if antibiotics are indicated. [MH]      ED Course User Index  [MH] Amelia Thurston APRN                                                 MDM  Number of Diagnoses or Management Options  Constipation, unspecified constipation type: new and requires workup     Amount and/or Complexity of Data Reviewed  Clinical lab tests: reviewed  Tests in the radiology section of CPT®: reviewed and ordered    Risk of Complications, Morbidity, and/or Mortality  Presenting problems: moderate  Diagnostic procedures: low  Management options: low    Patient Progress  Patient progress: stable      Final diagnoses:   Constipation, unspecified constipation type   Hematuria, unspecified type       ED Disposition  ED Disposition     ED Disposition Condition Comment    Discharge Stable           Juno Jerome MD  [address] [address]  [phone]    Schedule an appointment as soon as possible for a visit            Medication List      No changes were made to your prescriptions during this visit.          Amelia Thurston APRN  01/17/22 0233

## 2022-01-16 NOTE — ED PROVIDER NOTES
"Patient is 66 y.o. year old female that presents to the ED for evaluation of left lower quadrant abdominal pain.     Physical Exam    ED Course:    /75   Pulse 79   Temp 97.5 °F (36.4 °C) (Oral)   Resp 18   Ht 154.9 cm (61\")   Wt 53.9 kg (118 lb 13.3 oz)   SpO2 98%   BMI 22.45 kg/m²   Results for orders placed or performed during the hospital encounter of 01/16/22   Comprehensive Metabolic Panel    Specimen: Blood   Result Value Ref Range    Glucose 100 (H) 65 - 99 mg/dL    BUN 17 8 - 23 mg/dL    Creatinine 1.00 0.57 - 1.00 mg/dL    Sodium 138 136 - 145 mmol/L    Potassium 3.9 3.5 - 5.2 mmol/L    Chloride 98 98 - 107 mmol/L    CO2 24.6 22.0 - 29.0 mmol/L    Calcium 10.3 8.6 - 10.5 mg/dL    Total Protein 8.0 6.0 - 8.5 g/dL    Albumin 5.00 3.50 - 5.20 g/dL    ALT (SGPT) 17 1 - 33 U/L    AST (SGOT) 20 1 - 32 U/L    Alkaline Phosphatase 99 39 - 117 U/L    Total Bilirubin 0.8 0.0 - 1.2 mg/dL    eGFR Non African Amer 55 (L) >60 mL/min/1.73    Globulin 3.0 gm/dL    A/G Ratio 1.7 g/dL    BUN/Creatinine Ratio 17.0 7.0 - 25.0    Anion Gap 15.4 (H) 5.0 - 15.0 mmol/L   Lipase    Specimen: Blood   Result Value Ref Range    Lipase 38 13 - 60 U/L   CBC Auto Differential    Specimen: Blood   Result Value Ref Range    WBC 6.84 3.40 - 10.80 10*3/mm3    RBC 4.31 3.77 - 5.28 10*6/mm3    Hemoglobin 12.9 12.0 - 15.9 g/dL    Hematocrit 39.0 34.0 - 46.6 %    MCV 90.5 79.0 - 97.0 fL    MCH 29.9 26.6 - 33.0 pg    MCHC 33.1 31.5 - 35.7 g/dL    RDW 12.7 12.3 - 15.4 %    RDW-SD 42.2 37.0 - 54.0 fl    MPV 10.2 6.0 - 12.0 fL    Platelets 290 140 - 450 10*3/mm3    Neutrophil % 62.8 42.7 - 76.0 %    Lymphocyte % 28.2 19.6 - 45.3 %    Monocyte % 7.0 5.0 - 12.0 %    Eosinophil % 1.0 0.3 - 6.2 %    Basophil % 0.7 0.0 - 1.5 %    Immature Grans % 0.3 0.0 - 0.5 %    Neutrophils, Absolute 4.29 1.70 - 7.00 10*3/mm3    Lymphocytes, Absolute 1.93 0.70 - 3.10 10*3/mm3    Monocytes, Absolute 0.48 0.10 - 0.90 10*3/mm3    Eosinophils, Absolute 0.07 " 0.00 - 0.40 10*3/mm3    Basophils, Absolute 0.05 0.00 - 0.20 10*3/mm3    Immature Grans, Absolute 0.02 0.00 - 0.05 10*3/mm3    nRBC 0.0 0.0 - 0.2 /100 WBC   Green Top (Gel)   Result Value Ref Range    Extra Tube Hold for add-ons.    Lavender Top   Result Value Ref Range    Extra Tube hold for add-on    Gold Top - SST   Result Value Ref Range    Extra Tube Hold for add-ons.    Light Blue Top   Result Value Ref Range    Extra Tube hold for add-on      Medications   sodium chloride 0.9 % flush 10 mL (has no administration in time range)     XR Abdomen Flat & Upright    Result Date: 1/16/2022  Narrative: PROCEDURE: XR ABDOMEN FLAT AND UPRIGHT  COMPARISON: None  INDICATIONS: constipation  FINDINGS:  There are gas distended bowel loops without definite bowel dilatation.  There does not appear to be an above average amount of formed stool in the colon or rectum on this exam.  No definite ectopic bowel gas is identified.  Small calcifications in the lower pelvic soft tissues are most likely related to phleboliths.  No other definite suspicious calcifications are seen.  The visualized lung bases appear unremarkable.  Probable calcified lymph node is seen in the mediastinum, likely the sequelae of remote granulomatous disease.  No definite acute osseous abnormality is identified.      Impression:   1. There does not appear to be an above average amount of formed stool in the colon on this exam. 2. Nonspecific bowel gas pattern without definite small bowel dilatation.     MAGALI CAN MD       Electronically Signed and Approved By: MAGALI CAN MD on 1/16/2022 at 17:52               MDM:      The case was discussed between the EVELIO and myself. Patient  care including, but not limited to ordered imaging, medications, and lab results were reviewed. I then performed the substantive portion of the visit including all aspects of the medical decision making.        Robert Coronel MD  18:46 EST  01/16/22       Berna  MD Robert  01/16/22 [id number]

## 2022-01-18 LAB — BACTERIA SPEC AEROBE CULT: NO GROWTH

## 2022-04-19 ENCOUNTER — TELEPHONE (OUTPATIENT)
Dept: INTERNAL MEDICINE | Facility: CLINIC | Age: 67
End: 2022-04-19

## 2022-04-19 DIAGNOSIS — E55.9 VITAMIN D DEFICIENCY: ICD-10-CM

## 2022-04-19 DIAGNOSIS — Z00.00 WELL ADULT EXAM: Primary | ICD-10-CM

## 2022-04-19 NOTE — TELEPHONE ENCOUNTER
Patient called wanting to know what labs she needs to have done, missed appointment in December. Call back number is [phone]

## 2022-04-19 NOTE — TELEPHONE ENCOUNTER
Let her know orders were placed for routine well adult labs.  She can get these done anytime before her appointment, and they should be fasting.

## 2022-04-21 ENCOUNTER — LAB (OUTPATIENT)
Dept: LAB | Facility: HOSPITAL | Age: 67
End: 2022-04-21

## 2022-04-21 DIAGNOSIS — E55.9 VITAMIN D DEFICIENCY: ICD-10-CM

## 2022-04-21 DIAGNOSIS — Z00.00 WELL ADULT EXAM: ICD-10-CM

## 2022-04-21 LAB
25(OH)D3 SERPL-MCNC: 48.7 NG/ML (ref 30–100)
ALBUMIN SERPL-MCNC: 4.3 G/DL (ref 3.5–5.2)
ALBUMIN/GLOB SERPL: 1.9 G/DL
ALP SERPL-CCNC: 68 U/L (ref 39–117)
ALT SERPL W P-5'-P-CCNC: 21 U/L (ref 1–33)
ANION GAP SERPL CALCULATED.3IONS-SCNC: 11.8 MMOL/L (ref 5–15)
AST SERPL-CCNC: 23 U/L (ref 1–32)
BASOPHILS # BLD AUTO: 0.05 10*3/MM3 (ref 0–0.2)
BASOPHILS NFR BLD AUTO: 1.1 % (ref 0–1.5)
BILIRUB SERPL-MCNC: 0.5 MG/DL (ref 0–1.2)
BUN SERPL-MCNC: 19 MG/DL (ref 8–23)
BUN/CREAT SERPL: 20.2 (ref 7–25)
CALCIUM SPEC-SCNC: 9.6 MG/DL (ref 8.6–10.5)
CHLORIDE SERPL-SCNC: 104 MMOL/L (ref 98–107)
CHOLEST SERPL-MCNC: 177 MG/DL (ref 0–200)
CO2 SERPL-SCNC: 25.2 MMOL/L (ref 22–29)
CREAT SERPL-MCNC: 0.94 MG/DL (ref 0.57–1)
DEPRECATED RDW RBC AUTO: 39 FL (ref 37–54)
EGFRCR SERPLBLD CKD-EPI 2021: 66.6 ML/MIN/1.73
EOSINOPHIL # BLD AUTO: 0.12 10*3/MM3 (ref 0–0.4)
EOSINOPHIL NFR BLD AUTO: 2.6 % (ref 0.3–6.2)
ERYTHROCYTE [DISTWIDTH] IN BLOOD BY AUTOMATED COUNT: 12.5 % (ref 12.3–15.4)
GLOBULIN UR ELPH-MCNC: 2.3 GM/DL
GLUCOSE SERPL-MCNC: 89 MG/DL (ref 65–99)
HCT VFR BLD AUTO: 36 % (ref 34–46.6)
HCV AB SER DONR QL: NORMAL
HDLC SERPL-MCNC: 82 MG/DL (ref 40–60)
HGB BLD-MCNC: 12 G/DL (ref 12–15.9)
IMM GRANULOCYTES # BLD AUTO: 0.01 10*3/MM3 (ref 0–0.05)
IMM GRANULOCYTES NFR BLD AUTO: 0.2 % (ref 0–0.5)
LDLC SERPL CALC-MCNC: 83 MG/DL (ref 0–100)
LDLC/HDLC SERPL: 1 {RATIO}
LYMPHOCYTES # BLD AUTO: 1.96 10*3/MM3 (ref 0.7–3.1)
LYMPHOCYTES NFR BLD AUTO: 42 % (ref 19.6–45.3)
MCH RBC QN AUTO: 28.8 PG (ref 26.6–33)
MCHC RBC AUTO-ENTMCNC: 33.3 G/DL (ref 31.5–35.7)
MCV RBC AUTO: 86.5 FL (ref 79–97)
MONOCYTES # BLD AUTO: 0.37 10*3/MM3 (ref 0.1–0.9)
MONOCYTES NFR BLD AUTO: 7.9 % (ref 5–12)
NEUTROPHILS NFR BLD AUTO: 2.16 10*3/MM3 (ref 1.7–7)
NEUTROPHILS NFR BLD AUTO: 46.2 % (ref 42.7–76)
NRBC BLD AUTO-RTO: 0 /100 WBC (ref 0–0.2)
PLATELET # BLD AUTO: 279 10*3/MM3 (ref 140–450)
PMV BLD AUTO: 10.3 FL (ref 6–12)
POTASSIUM SERPL-SCNC: 4.1 MMOL/L (ref 3.5–5.2)
PROT SERPL-MCNC: 6.6 G/DL (ref 6–8.5)
RBC # BLD AUTO: 4.16 10*6/MM3 (ref 3.77–5.28)
SODIUM SERPL-SCNC: 141 MMOL/L (ref 136–145)
T4 FREE SERPL-MCNC: 1.27 NG/DL (ref 0.93–1.7)
TRIGL SERPL-MCNC: 64 MG/DL (ref 0–150)
TSH SERPL DL<=0.05 MIU/L-ACNC: 3.69 UIU/ML (ref 0.27–4.2)
VLDLC SERPL-MCNC: 12 MG/DL (ref 5–40)
WBC NRBC COR # BLD: 4.67 10*3/MM3 (ref 3.4–10.8)

## 2022-04-21 PROCEDURE — 86803 HEPATITIS C AB TEST: CPT

## 2022-04-21 PROCEDURE — 82306 VITAMIN D 25 HYDROXY: CPT

## 2022-04-21 PROCEDURE — 80061 LIPID PANEL: CPT

## 2022-04-21 PROCEDURE — 84439 ASSAY OF FREE THYROXINE: CPT

## 2022-04-21 PROCEDURE — 36415 COLL VENOUS BLD VENIPUNCTURE: CPT

## 2022-04-21 PROCEDURE — 84443 ASSAY THYROID STIM HORMONE: CPT

## 2022-04-21 PROCEDURE — 85025 COMPLETE CBC W/AUTO DIFF WBC: CPT

## 2022-04-21 PROCEDURE — 80053 COMPREHEN METABOLIC PANEL: CPT

## 2022-04-27 ENCOUNTER — OFFICE VISIT (OUTPATIENT)
Dept: INTERNAL MEDICINE | Facility: CLINIC | Age: 67
End: 2022-04-27

## 2022-04-27 VITALS
SYSTOLIC BLOOD PRESSURE: 127 MMHG | TEMPERATURE: 97.3 F | WEIGHT: 122 LBS | BODY MASS INDEX: 23.03 KG/M2 | DIASTOLIC BLOOD PRESSURE: 78 MMHG | HEIGHT: 61 IN | OXYGEN SATURATION: 98 % | HEART RATE: 80 BPM

## 2022-04-27 DIAGNOSIS — K63.5 POLYP OF COLON, UNSPECIFIED PART OF COLON, UNSPECIFIED TYPE: ICD-10-CM

## 2022-04-27 DIAGNOSIS — D72.818 OTHER DECREASED WHITE BLOOD CELL (WBC) COUNT: ICD-10-CM

## 2022-04-27 DIAGNOSIS — Z12.31 BREAST CANCER SCREENING BY MAMMOGRAM: ICD-10-CM

## 2022-04-27 DIAGNOSIS — M15.9 PRIMARY OSTEOARTHRITIS INVOLVING MULTIPLE JOINTS: ICD-10-CM

## 2022-04-27 DIAGNOSIS — D50.9 IRON DEFICIENCY ANEMIA, UNSPECIFIED IRON DEFICIENCY ANEMIA TYPE: ICD-10-CM

## 2022-04-27 DIAGNOSIS — E55.9 VITAMIN D DEFICIENCY: ICD-10-CM

## 2022-04-27 DIAGNOSIS — G43.109 MIGRAINE WITH AURA AND WITHOUT STATUS MIGRAINOSUS, NOT INTRACTABLE: ICD-10-CM

## 2022-04-27 DIAGNOSIS — M85.89 OSTEOPENIA OF MULTIPLE SITES: ICD-10-CM

## 2022-04-27 DIAGNOSIS — E78.2 MIXED HYPERLIPIDEMIA: Primary | ICD-10-CM

## 2022-04-27 DIAGNOSIS — F33.41 RECURRENT MAJOR DEPRESSION IN PARTIAL REMISSION: ICD-10-CM

## 2022-04-27 PROBLEM — Z00.00 WELL ADULT EXAM: Status: ACTIVE | Noted: 2022-04-27

## 2022-04-27 PROCEDURE — 99214 OFFICE O/P EST MOD 30 MIN: CPT | Performed by: INTERNAL MEDICINE

## 2022-04-27 RX ORDER — ATORVASTATIN CALCIUM 20 MG/1
20 TABLET, FILM COATED ORAL DAILY
COMMUNITY
End: 2022-04-27 | Stop reason: SDUPTHER

## 2022-04-27 RX ORDER — ATORVASTATIN CALCIUM 20 MG/1
20 TABLET, FILM COATED ORAL DAILY
Qty: 90 TABLET | Refills: 3 | Status: SHIPPED | OUTPATIENT
Start: 2022-04-27 | End: 2022-10-26 | Stop reason: SDUPTHER

## 2022-04-27 NOTE — ASSESSMENT & PLAN NOTE
Patient weaned herself off of this 3 or 4 months ago, she received improper dose from the pharmacy, had previously been on 20 mg, was given too high dose.  Patient very stable off of this as of 4/22 office visit, certainly no reason to revisit at this time.

## 2022-04-27 NOTE — ASSESSMENT & PLAN NOTE
Patient with scattered arthritic type pains, nothing persistent, she does not require any chronic medications for that as of her 4/22 office visit.

## 2022-04-27 NOTE — ASSESSMENT & PLAN NOTE
Hemoglobin stable at 12.0 as of her 4/22 office visit.  Patient is not taking any over-the-counter iron, and certainly is not indicated at this time.

## 2022-04-27 NOTE — ASSESSMENT & PLAN NOTE
Patient remains very active, that certainly good thing.  Her vitamin D level is normal, she is on calcium supplementation as well.  We will put into get a bone density later this year.

## 2022-04-27 NOTE — ASSESSMENT & PLAN NOTE
Patient's stable this regard as of her 4/22 office visit.  She says she is more likely to get the headaches when overly tired from working the 2 jobs.  She will have a aura, gets feels it in her neck above her ear.  Try to use Motrin first, if not helpful, Imitrex will take care of it.  Patient stable to continue with as needed Imitrex as written.

## 2022-04-27 NOTE — PROGRESS NOTES
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    Review of Systems   Constitutional: Negative for appetite change, fatigue and fever.   HENT: Negative for congestion and ear pain.    Eyes: Negative for blurred vision.   Respiratory: Negative for cough, chest tightness, shortness of breath and wheezing.    Cardiovascular: Negative for chest pain, palpitations and leg swelling.   Gastrointestinal: Negative for abdominal pain.   Genitourinary: Negative for difficulty urinating, dysuria and hematuria.   Musculoskeletal: Negative for arthralgias and gait problem.   Skin: Negative for skin lesions.   Neurological: Negative for syncope, memory problem and confusion.   Psychiatric/Behavioral: Negative for self-injury and depressed mood.       Objective   Vital Signs:   /78   Pulse 80   Temp 97.3 °F (36.3 °C)   Ht 154.9 cm (61\")   Wt 55.3 kg (122 lb)   SpO2 98%   BMI 23.05 kg/m²           Physical Exam  Vitals and nursing note reviewed.   Constitutional:       General: She is not in acute distress.     Appearance: Normal appearance. She is not toxic-appearing.   HENT:      Head: Atraumatic.      Right Ear: External ear normal.      Left Ear: External ear normal.      Nose: Nose normal.      Mouth/Throat:      Mouth: Mucous membranes are moist.   Eyes:      General:         Right eye: No discharge.         Left eye: No discharge.      Extraocular Movements: Extraocular movements intact.      Pupils: Pupils are equal, round, and reactive to light.   Cardiovascular:      Rate and Rhythm: Normal rate and regular rhythm.      Pulses: Normal pulses.      Heart sounds: Normal heart sounds. No murmur heard.    No gallop.   Pulmonary:      Effort: Pulmonary effort is " normal. No respiratory distress.      Breath sounds: No wheezing, rhonchi or rales.   Abdominal:      General: There is no distension.      Palpations: Abdomen is soft. There is no mass.      Tenderness: There is no abdominal tenderness. There is no guarding.   Musculoskeletal:         General: No swelling or tenderness.      Cervical back: No tenderness.      Right lower leg: No edema.      Left lower leg: No edema.   Skin:     General: Skin is warm and dry.      Findings: No rash.   Neurological:      General: No focal deficit present.      Mental Status: She is alert and oriented to person, place, and time. Mental status is at baseline.      Motor: No weakness.      Gait: Gait normal.   Psychiatric:         Mood and Affect: Mood normal.         Thought Content: Thought content normal.          Result Review   The following data was reviewed by: Juno Jerome MD on 04/27/2022:  [x] Laboratory  [] Microbiology  [] Radiology  [] EKG/telemetry  [] Cardiology/Vascular  [] Pathology  [x] Old records             Assessment and Plan   Diagnoses and all orders for this visit:    1. Mixed hyperlipidemia (Primary)  Assessment & Plan:  LDL is down from 14 to 83 as of 4/22 OV.  Pt is stable to continue with low dose lipitor.    Orders:  -     Comprehensive Metabolic Panel; Future  -     Lipid Panel; Future    2. Osteopenia of multiple sites  Overview:  BMD 9/19:    Spine -1.3  Hip -1.7    Assessment & Plan:  Patient remains very active, that certainly good thing.  Her vitamin D level is normal, she is on calcium supplementation as well.  We will put into get a bone density later this year.    Orders:  -     DEXA Bone Density Axial; Future    3. Iron deficiency anemia, unspecified iron deficiency anemia type  Assessment & Plan:  Hemoglobin stable at 12.0 as of her 4/22 office visit.  Patient is not taking any over-the-counter iron, and certainly is not indicated at this time.      4. Primary osteoarthritis involving multiple  joints  Assessment & Plan:  Patient with scattered arthritic type pains, nothing persistent, she does not require any chronic medications for that as of her 4/22 office visit.      5. Recurrent major depression in partial remission (HCC)  Assessment & Plan:   Patient weaned herself off of this 3 or 4 months ago, she received improper dose from the pharmacy, had previously been on 20 mg, was given too high dose.  Patient very stable off of this as of 4/22 office visit, certainly no reason to revisit at this time.      6. Vitamin D deficiency  Assessment & Plan:  Vit D level is 49 as of 4/22 OV, so stable to continue same OTC dose.    Orders:  -     Vitamin D 25 Hydroxy; Future    7. Other decreased white blood cell (WBC) count  Assessment & Plan:  WBC 4.6 as of 4/22 OV with nl differential, so will f/u labs PRN.      8. Migraine with aura and without status migrainosus, not intractable  Assessment & Plan:  Patient's stable this regard as of her 4/22 office visit.  She says she is more likely to get the headaches when overly tired from working the 2 jobs.  She will have a aura, gets feels it in her neck above her ear.  Try to use Motrin first, if not helpful, Imitrex will take care of it.  Patient stable to continue with as needed Imitrex as written.        9. Breast cancer screening by mammogram  -     Cancel: Mammo Screening Digital Tomosynthesis Bilateral With CAD; Future  -     Mammo Screening Digital Tomosynthesis Bilateral With CAD; Future    10. Polyp of colon, unspecified part of colon, unspecified type  Assessment & Plan:  Patient is past due for this as of her 4/22 office visit, we will schedule her with Dr. Canales at this time.    Orders:  -     Ambulatory Referral to Gastroenterology    Other orders  -     atorvastatin (LIPITOR) 20 MG tablet; Take 1 tablet by mouth Daily.  Dispense: 90 tablet; Refill: 3    --  --  OLDER NOTES:  VISIT 6/21:  ANNUAL PHYSICAL 7/20 and d/w all labs in detail; no ischemic sx's  "at work/home.  --  HYPOTHYROIDISM is new as of 2/18 OV=no sxs, so will repeat...wnl as of 8/18 OV...fine 7/19 w/o meds...ditto 7/20---> wnl 6/21.  --  ANXIETY D/O = f/u on SSRI past 6 weeks or so, took off a week, daughter who is on disability moved out into [facility], no car/drivers license=they got custody of 7 yo grand-daughter, apparently this daughter has schizophrenia just like her son; so, stress is understandable, but does feel better on Celexa=calmer...pleasant---> no new concerns 6/21  WT LOSS of 4 lbs after down a few prior to above; no N/V/blood in stool; asked her to monitor at home...down 10 total to 114 and I d/w eat more please...stable at least--->120's holding 6/21.  --  LEUKOPENIA 1/17 is new and will repeat 6 mo...4.8 is better/fine, and has been lower prior actually; all lines wnl...4.1 with nl diff...4.5 is same 7/20, but need to keep eye on Lymphs---> wnl 6/21.  ANEMIA = 10.9 as of 7/19 OV, so will get more labs on RTO...better 7/20--->same 6/21 and not interested in c-scope.  --  LIPIDS.../HDL 90 so d/w tx on RTO if the same...152 and d/w stop the strict diet as above; ? tx if back up...130 fine...131 is holding/ HDL 70's...153 again and I rec tx as of 7/19 OV...92 is nice drop=repeat on RTO fall '20...133 and reports c/w, so needs 20 mg now---> 149 in 6/21 and says it's too expensive; so I printed out $12 coupon for 90 days.  --  MIGRANE H/A...stable...3 pills/month...less than that recently...about 4-5x/month is current/typical average...less at 11/19 OV, but she doesn't want any preventive meds (Had MRI per ENT 9/19 for hearing loss).  \"SPELLS\" per HPI, exam is benign, 1 spell at work, 1 when driving, ? panic attack and d/w call if recurrent...still no c/o presently.  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  --  VIT D DEF stable=33...25 and I d/w her 2/18---> 39 in 7/20 = RTO.  OSTEOPENIA...BMD 11/16...spine -1.3, hip -1.6...9/19 = spine -1.3, hip -1.7---> needed fall '21.  --  L KNEE PAIN 7/20 with neg xray per urgent care and I d/w eval per Ortho now since pain when she is on it; wear ace sleeve.  --   MMG 9/15/20 per me.  COLON 10/14...polyps again/FH + = B...3 years per Dr Delgado...d/w 8/17 OV...n/c.  Adacel 8/18; Covid rec 6/21 = she's giving the shots, but doesn't want to take it??  ( #2 at age 39, 1 girl still trying to be NP in [place] as of 2/18 OV, has 3 kids; other girl bipolar/schizo, 1 son with ? 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    Follow Up   Return in about 6 months (around 10/27/2022).  Patient was given instructions and counseling regarding her condition or for health maintenance advice. Please see specific information pulled into the AVS if appropriate.

## 2022-04-27 NOTE — ASSESSMENT & PLAN NOTE
Patient is past due for this as of her 4/22 office visit, we will schedule her with Dr. Canales at this time.

## 2022-07-27 ENCOUNTER — HOSPITAL ENCOUNTER (OUTPATIENT)
Dept: MAMMOGRAPHY | Facility: HOSPITAL | Age: 67
Discharge: HOME | End: 2022-07-27

## 2022-07-27 ENCOUNTER — HOSPITAL ENCOUNTER (OUTPATIENT)
Dept: BONE DENSITY | Facility: HOSPITAL | Age: 67
Discharge: HOME | End: 2022-07-27

## 2022-07-27 DIAGNOSIS — Z12.31 BREAST CANCER SCREENING BY MAMMOGRAM: ICD-10-CM

## 2022-07-27 DIAGNOSIS — M85.89 OSTEOPENIA OF MULTIPLE SITES: ICD-10-CM

## 2022-07-27 PROCEDURE — 77080 DXA BONE DENSITY AXIAL: CPT

## 2022-07-27 PROCEDURE — 77063 BREAST TOMOSYNTHESIS BI: CPT

## 2022-07-27 PROCEDURE — 77067 SCR MAMMO BI INCL CAD: CPT

## 2022-09-12 ENCOUNTER — PREP FOR SURGERY (OUTPATIENT)
Dept: OTHER | Facility: HOSPITAL | Age: 67
End: 2022-09-12

## 2022-09-12 ENCOUNTER — CLINICAL SUPPORT (OUTPATIENT)
Dept: GASTROENTEROLOGY | Facility: CLINIC | Age: 67
End: 2022-09-12

## 2022-09-12 DIAGNOSIS — Z86.010 HISTORY OF COLON POLYPS: Primary | ICD-10-CM

## 2022-09-12 NOTE — PROGRESS NOTES
Giselle Tristan  REASON FOR CALL: SCREENING CALL, LAST COLON 2017 DR. SHEA  SENT IN PREP: [place]  DOS: 12.09.2022    Past Medical History:   Diagnosis Date   • Allergic rhinitis due to pollen    • Atrophy of thyroid (acquired)    • Decreased white blood cell count, unspecified    • History of colon polyps    • History of migraine headaches    • Impaired fasting glucose    • Iron deficiency anemia, unspecified    • Leukopenia    • Migraine without aura, intractable, without status migrainosus    • Mixed hyperlipidemia    • Mixed hyperlipidemia    • Other fatigue    • Other specified disorders of white blood cells    • Panic disorder without agoraphobia    • Polyp of colon    • Pure hypercholesterolemia    • Unspecified osteoarthritis, unspecified site    • Vitamin D deficiency, unspecified      No Known Allergies  Past Surgical History:   Procedure Laterality Date   • COLONOSCOPY  2017    DR. SHEA   • TUBAL ABDOMINAL LIGATION Bilateral      Social History     Socioeconomic History   • Marital status:    Tobacco Use   • Smoking status: Never Smoker   • Smokeless tobacco: Never Used   Vaping Use   • Vaping Use: Never used   Substance and Sexual Activity   • Alcohol use: Never   • Drug use: Never   • Sexual activity: Defer     Family History   Problem Relation Age of Onset   • No Known Problems Mother    • Skin cancer Father    • Hypertension Father    • No Known Problems Sister    • Colon cancer Brother        Current Outpatient Medications:   •  atorvastatin (LIPITOR) 20 MG tablet, Take 1 tablet by mouth Daily., Disp: 90 tablet, Rfl: 3  •  calcium carbonate-cholecalciferol 500-400 MG-UNIT tablet tablet, Take 2 tablets by mouth 2 (Two) Times a Day With Meals., Disp: 360 tablet, Rfl: 1  •  azithromycin (Zithromax Z-Clayton) 250 MG tablet, Take as directed, Disp: 6 tablet, Rfl: 0  •  citalopram (CeleXA) 20 MG tablet, Daily., Disp: , Rfl:   •  metaxalone (SKELAXIN) 800 MG tablet, 1/2 tablet, Disp: , Rfl:    •  naproxen (NAPROSYN) 375 MG tablet, 1 tablet, Disp: , Rfl:   •  SUMAtriptan (Imitrex) 100 MG tablet, Take one tablet at onset of headache. May repeat dose one time in 2 hours if headache not relieved., Disp: 20 tablet, Rfl: 0

## 2022-09-13 PROBLEM — Z86.010 HISTORY OF COLON POLYPS: Status: ACTIVE | Noted: 2022-09-13

## 2022-09-13 PROBLEM — Z86.0100 HISTORY OF COLON POLYPS: Status: ACTIVE | Noted: 2022-09-13

## 2022-10-21 ENCOUNTER — LAB (OUTPATIENT)
Dept: LAB | Facility: HOSPITAL | Age: 67
End: 2022-10-21

## 2022-10-21 DIAGNOSIS — E78.2 MIXED HYPERLIPIDEMIA: ICD-10-CM

## 2022-10-21 DIAGNOSIS — E55.9 VITAMIN D DEFICIENCY: ICD-10-CM

## 2022-10-21 LAB
25(OH)D3 SERPL-MCNC: 48.6 NG/ML (ref 30–100)
ALBUMIN SERPL-MCNC: 4.4 G/DL (ref 3.5–5.2)
ALBUMIN/GLOB SERPL: 2.1 G/DL
ALP SERPL-CCNC: 64 U/L (ref 39–117)
ALT SERPL W P-5'-P-CCNC: 21 U/L (ref 1–33)
ANION GAP SERPL CALCULATED.3IONS-SCNC: 6.3 MMOL/L (ref 5–15)
AST SERPL-CCNC: 24 U/L (ref 1–32)
BILIRUB SERPL-MCNC: 0.2 MG/DL (ref 0–1.2)
BUN SERPL-MCNC: 19 MG/DL (ref 8–23)
BUN/CREAT SERPL: 21.3 (ref 7–25)
CALCIUM SPEC-SCNC: 9.1 MG/DL (ref 8.6–10.5)
CHLORIDE SERPL-SCNC: 104 MMOL/L (ref 98–107)
CHOLEST SERPL-MCNC: 181 MG/DL (ref 0–200)
CO2 SERPL-SCNC: 26.7 MMOL/L (ref 22–29)
CREAT SERPL-MCNC: 0.89 MG/DL (ref 0.57–1)
EGFRCR SERPLBLD CKD-EPI 2021: 71.2 ML/MIN/1.73
GLOBULIN UR ELPH-MCNC: 2.1 GM/DL
GLUCOSE SERPL-MCNC: 98 MG/DL (ref 65–99)
HDLC SERPL-MCNC: 75 MG/DL (ref 40–60)
LDLC SERPL CALC-MCNC: 95 MG/DL (ref 0–100)
LDLC/HDLC SERPL: 1.26 {RATIO}
POTASSIUM SERPL-SCNC: 4.4 MMOL/L (ref 3.5–5.2)
PROT SERPL-MCNC: 6.5 G/DL (ref 6–8.5)
SODIUM SERPL-SCNC: 137 MMOL/L (ref 136–145)
TRIGL SERPL-MCNC: 58 MG/DL (ref 0–150)
VLDLC SERPL-MCNC: 11 MG/DL (ref 5–40)

## 2022-10-21 PROCEDURE — 82306 VITAMIN D 25 HYDROXY: CPT

## 2022-10-21 PROCEDURE — 36415 COLL VENOUS BLD VENIPUNCTURE: CPT

## 2022-10-21 PROCEDURE — 80061 LIPID PANEL: CPT

## 2022-10-21 PROCEDURE — 80053 COMPREHEN METABOLIC PANEL: CPT

## 2022-10-26 ENCOUNTER — OFFICE VISIT (OUTPATIENT)
Dept: INTERNAL MEDICINE | Facility: CLINIC | Age: 67
End: 2022-10-26

## 2022-10-26 VITALS
OXYGEN SATURATION: 97 % | SYSTOLIC BLOOD PRESSURE: 119 MMHG | HEIGHT: 61 IN | TEMPERATURE: 97.5 F | HEART RATE: 86 BPM | WEIGHT: 125.8 LBS | BODY MASS INDEX: 23.75 KG/M2 | DIASTOLIC BLOOD PRESSURE: 80 MMHG

## 2022-10-26 DIAGNOSIS — E55.9 VITAMIN D DEFICIENCY: ICD-10-CM

## 2022-10-26 DIAGNOSIS — M15.9 PRIMARY OSTEOARTHRITIS INVOLVING MULTIPLE JOINTS: ICD-10-CM

## 2022-10-26 DIAGNOSIS — Z00.00 WELL ADULT EXAM: ICD-10-CM

## 2022-10-26 DIAGNOSIS — E78.2 MIXED HYPERLIPIDEMIA: Primary | ICD-10-CM

## 2022-10-26 DIAGNOSIS — K63.5 POLYP OF COLON, UNSPECIFIED PART OF COLON, UNSPECIFIED TYPE: ICD-10-CM

## 2022-10-26 DIAGNOSIS — M85.89 OSTEOPENIA OF MULTIPLE SITES: ICD-10-CM

## 2022-10-26 PROCEDURE — 99214 OFFICE O/P EST MOD 30 MIN: CPT | Performed by: INTERNAL MEDICINE

## 2022-10-26 RX ORDER — ATORVASTATIN CALCIUM 20 MG/1
20 TABLET, FILM COATED ORAL DAILY
Qty: 90 TABLET | Refills: 3 | Status: SHIPPED | OUTPATIENT
Start: 2022-10-26 | End: 2022-10-26

## 2022-10-26 RX ORDER — ATORVASTATIN CALCIUM 20 MG/1
20 TABLET, FILM COATED ORAL DAILY
Qty: 90 TABLET | Refills: 3 | Status: SHIPPED | OUTPATIENT
Start: 2022-10-26

## 2022-10-26 NOTE — ASSESSMENT & PLAN NOTE
Vitamin D level stable at 49 as of her 10/22 office visit.  She stable to continue with low-dose over-the-counter supplementation only.

## 2022-10-26 NOTE — ASSESSMENT & PLAN NOTE
We reviewed her BMD at her 10/22 appointment.  The bone density has not changed at all in the past 3 years.  Patient stable to continue with over-the-counter vitamin D and calcium supplementation as well as with routine physical activity.

## 2022-10-26 NOTE — ASSESSMENT & PLAN NOTE
LDL is stable at 95 as of her 10/22 office visit.  Patient is stable to continue with low to moderate dose atorvastatin.

## 2022-10-26 NOTE — PROGRESS NOTES
"Chief Complaint  Hyperlipidemia and Follow-up (Pt states that this is routine and she had labs done, she has no new issues. 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  We will go over her med list, review any labs obtained in detail together, and address any new concerns as time permits.    Review of Systems   Constitutional: Negative for appetite change, fatigue and fever.   HENT: Negative for congestion and ear pain.    Eyes: Negative for blurred vision.   Respiratory: Negative for cough, chest tightness, shortness of breath and wheezing.    Cardiovascular: Negative for chest pain, palpitations and leg swelling.   Gastrointestinal: Negative for abdominal pain.   Genitourinary: Negative for difficulty urinating, dysuria and hematuria.   Musculoskeletal: Negative for arthralgias and gait problem.   Skin: Negative for skin lesions.   Neurological: Negative for syncope, memory problem and confusion.   Psychiatric/Behavioral: Negative for self-injury and depressed mood.       Objective   Vital Signs:   /80 (BP Location: Right arm, Patient Position: Sitting, Cuff Size: Adult)   Pulse 86   Temp 97.5 °F (36.4 °C) (Skin)   Ht 154.9 cm (60.98\")   Wt 57.1 kg (125 lb 12.8 oz)   SpO2 97%   BMI 23.78 kg/m²           Physical Exam  Vitals and nursing note reviewed.   Constitutional:       General: She is not in acute distress.     Appearance: Normal appearance. She is not toxic-appearing.   HENT:      Head: Atraumatic.      Right Ear: External ear normal.      Left Ear: External ear normal.      Nose: Nose normal.      Mouth/Throat:      Mouth: Mucous membranes are moist.   Eyes:      General:         Right eye: No discharge.         Left eye: No discharge.      Extraocular " Movements: Extraocular movements intact.      Pupils: Pupils are equal, round, and reactive to light.   Cardiovascular:      Rate and Rhythm: Normal rate and regular rhythm.      Pulses: Normal pulses.      Heart sounds: Normal heart sounds. No murmur heard.    No gallop.   Pulmonary:      Effort: Pulmonary effort is normal. No respiratory distress.      Breath sounds: No wheezing, rhonchi or rales.   Abdominal:      General: There is no distension.      Palpations: Abdomen is soft. There is no mass.      Tenderness: There is no abdominal tenderness. There is no guarding.   Musculoskeletal:         General: No swelling or tenderness.      Cervical back: No tenderness.      Right lower leg: No edema.      Left lower leg: No edema.   Skin:     General: Skin is warm and dry.      Findings: No rash.   Neurological:      General: No focal deficit present.      Mental Status: She is alert and oriented to person, place, and time. Mental status is at baseline.      Motor: No weakness.      Gait: Gait normal.   Psychiatric:         Mood and Affect: Mood normal.         Thought Content: Thought content normal.          Result Review   The following data was reviewed by: Juno Jerome MD on 04/27/2022:  [x] Laboratory  [] Microbiology  [] Radiology  [] EKG/telemetry  [] Cardiology/Vascular  [] Pathology  [x] Old records             Assessment and Plan   Diagnoses and all orders for this visit:    1. Mixed hyperlipidemia (Primary)  Assessment & Plan:  LDL is stable at 95 as of her 10/22 office visit.  Patient is stable to continue with low to moderate dose atorvastatin.    Orders:  -     Comprehensive Metabolic Panel; Future  -     Lipid Panel; Future    2. Polyp of colon, unspecified part of colon, unspecified type  Assessment & Plan:  She is set up for this in 12/22.      3. Osteopenia of multiple sites  Overview:  BMD 9/19:  Spine -1.3  Hip -1.7    BMD 7/22:  Spine -1.3  Hip -1.7        Assessment & Plan:  We reviewed her  BMD at her 10/22 appointment.  The bone density has not changed at all in the past 3 years.  Patient stable to continue with over-the-counter vitamin D and calcium supplementation as well as with routine physical activity.      4. Primary osteoarthritis involving multiple joints    5. Vitamin D deficiency  Assessment & Plan:  Vitamin D level stable at 49 as of her 10/22 office visit.  She stable to continue with low-dose over-the-counter supplementation only.    Orders:  -     Vitamin D,25-Hydroxy; Future    6. Well adult exam  -     CBC & Differential; Future  -     Vitamin B12 anemia; Future  -     Folate anemia; Future    Other orders  -     Discontinue: atorvastatin (LIPITOR) 20 MG tablet; Take 1 tablet by mouth Daily.  Dispense: 90 tablet; Refill: 3  -     atorvastatin (LIPITOR) 20 MG tablet; Take 1 tablet by mouth Daily.  Dispense: 90 tablet; Refill: 3    --  --  OLDER NOTES:  VISIT 6/21:  ANNUAL PHYSICAL 7/20 and d/w all labs in detail; no ischemic sx's at work/home.  --  HYPOTHYROIDISM is new as of 2/18 OV=no sxs, so will repeat...wnl as of 8/18 OV...fine 7/19 w/o meds...ditto 7/20---> wnl 6/21.  --  ANXIETY D/O = f/u on SSRI past 6 weeks or so, took off a week, daughter who is on disability moved out into [facility], no car/drivers license=they got custody of 9 yo grand-daughter, apparently this daughter has schizophrenia just like her son; so, stress is understandable, but does feel better on Celexa=calmer...pleasant---> no new concerns 6/21  WT LOSS of 4 lbs after down a few prior to above; no N/V/blood in stool; asked her to monitor at home...down 10 total to 114 and I d/w eat more please...stable at least--->120's holding 6/21.  --  LEUKOPENIA 1/17 is new and will repeat 6 mo...4.8 is better/fine, and has been lower prior actually; all lines wnl...4.1 with nl diff...4.5 is same 7/20, but need to keep eye on Lymphs---> wnl 6/21.  ANEMIA = 10.9 as of 7/19 OV, so will get more labs on RTO...better  "7/20--->same 6/21 and not interested in c-scope.  --  LIPIDS.../HDL 90 so d/w tx on RTO if the same...152 and d/w stop the strict diet as above; ? tx if back up...130 fine...131 is holding/ HDL 70's...153 again and I rec tx as of 7/19 OV...92 is nice drop=repeat on RTO fall '20...133 and reports c/w, so needs 20 mg now---> 149 in 6/21 and says it's too expensive; so I printed out $12 coupon for 90 days.  --  MIGRANE H/A...stable...3 pills/month...less than that recently...about 4-5x/month is current/typical average...less at 11/19 OV, but she doesn't want any preventive meds (Had MRI per ENT 9/19 for hearing loss).  \"SPELLS\" per HPI, exam is benign, 1 spell at work, 1 when driving, ? panic attack and d/w call if recurrent...still no c/o presently.  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  --  VIT D DEF stable=33...25 and I d/w her 2/18---> 39 in 7/20 = RTO.  OSTEOPENIA...BMD 11/16...spine -1.3, hip -1.6...9/19 = spine -1.3, hip -1.7---> needed fall '21.  --  L KNEE PAIN 7/20 with neg xray per urgent care and I d/w eval per Ortho now since pain when she is on it; wear ace sleeve.  --   MMG 7/27/2022 per me.  COLON 10/14...polyps again/FH + = B...3 years per Dr Delgado...d/w 8/17 OV...n/c---> aurelio for 12/22.  Adacel 8/18; Covid rec 6/21.  ( #2 at age 39, 1 girl still trying to be NP in [place] as of 2/18 OV, has 3 kids; other girl bipolar/schizo, 1 son with ? 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    Follow Up   Return in about 6 months (around 5/1/2023).  Patient was given instructions and counseling regarding her condition or for health maintenance advice. Please see specific information pulled into the AVS if appropriate.

## 2022-11-22 ENCOUNTER — TELEPHONE (OUTPATIENT)
Dept: GASTROENTEROLOGY | Facility: CLINIC | Age: 67
End: 2022-11-22

## 2022-11-22 NOTE — TELEPHONE ENCOUNTER
I left  reminding Ms Tristan of her scheduled scope on 12.09.22, arrival time of 10:00am. Reminded of liquid diet the day prior. Reminded of bowel prep and instructions. [email or web address]

## 2022-12-09 ENCOUNTER — ANESTHESIA EVENT (OUTPATIENT)
Dept: GASTROENTEROLOGY | Facility: HOSPITAL | Age: 67
End: 2022-12-09

## 2022-12-09 ENCOUNTER — HOSPITAL ENCOUNTER (OUTPATIENT)
Facility: HOSPITAL | Age: 67
Setting detail: HOSPITAL OUTPATIENT SURGERY
Discharge: HOME | End: 2022-12-09
Attending: INTERNAL MEDICINE | Admitting: INTERNAL MEDICINE

## 2022-12-09 ENCOUNTER — ANESTHESIA (OUTPATIENT)
Dept: GASTROENTEROLOGY | Facility: HOSPITAL | Age: 67
End: 2022-12-09

## 2022-12-09 VITALS
SYSTOLIC BLOOD PRESSURE: 114 MMHG | BODY MASS INDEX: 23.81 KG/M2 | DIASTOLIC BLOOD PRESSURE: 82 MMHG | WEIGHT: 121.25 LBS | OXYGEN SATURATION: 100 % | HEART RATE: 71 BPM | RESPIRATION RATE: 16 BRPM | HEIGHT: 60 IN | TEMPERATURE: 97.7 F

## 2022-12-09 DIAGNOSIS — Z86.010 HISTORY OF COLON POLYPS: ICD-10-CM

## 2022-12-09 PROCEDURE — 45385 COLONOSCOPY W/LESION REMOVAL: CPT | Performed by: INTERNAL MEDICINE

## 2022-12-09 PROCEDURE — 25010000002 PROPOFOL 10 MG/ML EMULSION: Performed by: NURSE ANESTHETIST, CERTIFIED REGISTERED

## 2022-12-09 PROCEDURE — 88305 TISSUE EXAM BY PATHOLOGIST: CPT | Performed by: INTERNAL MEDICINE

## 2022-12-09 RX ORDER — PROPOFOL 10 MG/ML
VIAL (ML) INTRAVENOUS AS NEEDED
Status: DISCONTINUED | OUTPATIENT
Start: 2022-12-09 | End: 2022-12-09 | Stop reason: SURG

## 2022-12-09 RX ORDER — SODIUM CHLORIDE, SODIUM LACTATE, POTASSIUM CHLORIDE, CALCIUM CHLORIDE 600; 310; 30; 20 MG/100ML; MG/100ML; MG/100ML; MG/100ML
30 INJECTION, SOLUTION INTRAVENOUS CONTINUOUS
Status: DISCONTINUED | OUTPATIENT
Start: 2022-12-09 | End: 2022-12-09 | Stop reason: HOSPADM

## 2022-12-09 RX ORDER — LIDOCAINE HYDROCHLORIDE 20 MG/ML
INJECTION, SOLUTION EPIDURAL; INFILTRATION; INTRACAUDAL; PERINEURAL AS NEEDED
Status: DISCONTINUED | OUTPATIENT
Start: 2022-12-09 | End: 2022-12-09 | Stop reason: SURG

## 2022-12-09 RX ADMIN — PROPOFOL 50 MG: 10 INJECTION, EMULSION INTRAVENOUS at 12:43

## 2022-12-09 RX ADMIN — LIDOCAINE HYDROCHLORIDE 100 MG: 20 INJECTION, SOLUTION EPIDURAL; INFILTRATION; INTRACAUDAL; PERINEURAL at 12:43

## 2022-12-09 RX ADMIN — LIDOCAINE HYDROCHLORIDE 50 MG: 20 INJECTION, SOLUTION EPIDURAL; INFILTRATION; INTRACAUDAL; PERINEURAL at 13:05

## 2022-12-09 RX ADMIN — PROPOFOL 125 MCG/KG/MIN: 10 INJECTION, EMULSION INTRAVENOUS at 12:43

## 2022-12-09 RX ADMIN — SODIUM CHLORIDE, POTASSIUM CHLORIDE, SODIUM LACTATE AND CALCIUM CHLORIDE 30 ML/HR: 600; 310; 30; 20 INJECTION, SOLUTION INTRAVENOUS at 12:03

## 2022-12-09 NOTE — H&P
"ScreeningPre Procedure History & Physical    Chief Complaint:   Screening     Subjective     HPI:   Screening     Past Medical History:   Past Medical History:   Diagnosis Date   • Allergic rhinitis due to pollen    • History of colon polyps    • History of migraine headaches    • Impaired fasting glucose    • Iron deficiency anemia, unspecified    • Leukopenia    • Mixed hyperlipidemia    • Panic disorder without agoraphobia    • Unspecified osteoarthritis, unspecified site    • Vitamin D deficiency, unspecified        Past Surgical History:  Past Surgical History:   Procedure Laterality Date   • COLONOSCOPY  2017    DR. SHEA   • TUBAL ABDOMINAL LIGATION Bilateral        Family History:  Family History   Problem Relation Age of Onset   • No Known Problems Mother    • Skin cancer Father    • Hypertension Father    • No Known Problems Sister    • Colon cancer Brother    • Malig Hyperthermia Neg Hx        Social History:   reports that she has never smoked. She has never used smokeless tobacco. She reports that she does not drink alcohol and does not use drugs.    Medications:   Medications Prior to Admission   Medication Sig Dispense Refill Last Dose   • atorvastatin (LIPITOR) 20 MG tablet Take 1 tablet by mouth Daily. 90 tablet 3 12/8/2022   • calcium carbonate-cholecalciferol 500-400 MG-UNIT tablet tablet Take 2 tablets by mouth 2 (Two) Times a Day With Meals. 360 tablet 1 12/8/2022   • SUMAtriptan (Imitrex) 100 MG tablet Take one tablet at onset of headache. May repeat dose one time in 2 hours if headache not relieved. 20 tablet 0 12/8/2022       Allergies:  Patient has no known allergies.        Objective     Blood pressure 135/72, pulse 63, temperature 98.3 °F (36.8 °C), temperature source Temporal, resp. rate 18, height 152.4 cm (60\"), weight 55 kg (121 lb 4.1 oz), SpO2 98 %.    Physical Exam   Constitutional: Pt is oriented to person, place, and time and well-developed, well-nourished, and in no distress. "   Mouth/Throat: Oropharynx is clear and moist.   Neck: Normal range of motion.   Cardiovascular: Normal rate, regular rhythm and normal heart sounds.    Pulmonary/Chest: Effort normal and breath sounds normal.   Abdominal: Soft. Nontender  Skin: Skin is warm and dry.   Psychiatric: Mood, memory, affect and judgment normal.     Assessment & Plan     Diagnosis:  Screening colonoscopy  H/o colon polyps     Anticipated Surgical Procedure:  colonoscopy    The risks, benefits, and alternatives of this procedure have been discussed with the patient or the responsible party- the patient understands and agrees to proceed.

## 2022-12-09 NOTE — ANESTHESIA PREPROCEDURE EVALUATION
Anesthesia Evaluation     Patient summary reviewed and Nursing notes reviewed                Airway   Mallampati: I  TM distance: >3 FB  Neck ROM: full  No difficulty expected  Dental      Pulmonary - negative pulmonary ROS and normal exam    breath sounds clear to auscultation  Cardiovascular - normal exam    Rhythm: regular  Rate: normal    (+) hyperlipidemia,       Neuro/Psych  (+) headaches, psychiatric history Depression,    GI/Hepatic/Renal/Endo - negative ROS     Musculoskeletal     Abdominal    Substance History - negative use     OB/GYN negative ob/gyn ROS         Other   arthritis,                      Anesthesia Plan    ASA 1     general     intravenous induction     Anesthetic plan, risks, benefits, and alternatives have been provided, discussed and informed consent has been obtained with: patient.        CODE STATUS:

## 2022-12-09 NOTE — ANESTHESIA POSTPROCEDURE EVALUATION
Patient: Giselle Tristan    Procedure Summary     Date: 12/09/22 Room / Location: [facility] ENDOSCOPY 2 / [facility] END[facility]COPY    Anesthesia Start: 1241 Anesthesia Stop: 1315    Procedure: COLONOSCOPY WITH COLD SNARE POLYPECTOMY Diagnosis:       History of colon polyps      (History of colon polyps [Z86.010])    Surgeons: Steven Delgado MD Provider: Don Bower MD    Anesthesia Type: general ASA Status: 1          Anesthesia Type: general    Vitals  Vitals Value Taken Time   /71 12/09/22 1317   Temp 36.1 °C (97 °F) 12/09/22 1315   Pulse 88 12/09/22 1321   Resp 16 12/09/22 1315   SpO2 91 % 12/09/22 1321   Vitals shown include unvalidated device data.        Post Anesthesia Care and Evaluation    Patient location during evaluation: bedside  Patient participation: complete - patient participated  Level of consciousness: awake  Pain management: adequate    Airway patency: patent  Anesthetic complications: No anesthetic complications  PONV Status: none  Cardiovascular status: acceptable  Respiratory status: acceptable  Hydration status: acceptable    Comments: An Anesthesiologist personally participated in the most demanding procedures (including induction and emergence if applicable) in the anesthesia plan, monitored the course of anesthesia administration at frequent intervals and remained physically present and available for immediate diagnosis and treatment of emergencies.

## 2022-12-12 ENCOUNTER — TELEPHONE (OUTPATIENT)
Dept: GASTROENTEROLOGY | Facility: CLINIC | Age: 67
End: 2022-12-12

## 2022-12-12 LAB
CYTO UR: NORMAL
LAB AP CASE REPORT: NORMAL
LAB AP CLINICAL INFORMATION: NORMAL
PATH REPORT.FINAL DX SPEC: NORMAL
PATH REPORT.GROSS SPEC: NORMAL

## 2022-12-12 NOTE — TELEPHONE ENCOUNTER
Patient placed in 5 year colon recall ----- Message from MICHAELLE Gabriel sent at 12/12/2022  9:33 AM EST -----  I have reviewed the patient colonoscopy and pathology report. Patient has tubular adenoma polyp(s) on pathology report that are smaller than 10mm in size. It is recommended the patient be on a 5 year recall. Please place in the recall system.

## 2023-04-28 ENCOUNTER — LAB (OUTPATIENT)
Dept: LAB | Facility: HOSPITAL | Age: 68
End: 2023-04-28
Payer: MEDICARE

## 2023-04-28 DIAGNOSIS — Z00.00 WELL ADULT EXAM: ICD-10-CM

## 2023-04-28 DIAGNOSIS — E78.2 MIXED HYPERLIPIDEMIA: ICD-10-CM

## 2023-04-28 DIAGNOSIS — E55.9 VITAMIN D DEFICIENCY: ICD-10-CM

## 2023-04-28 LAB
25(OH)D3 SERPL-MCNC: 37.4 NG/ML (ref 30–100)
ALBUMIN SERPL-MCNC: 4.4 G/DL (ref 3.5–5.2)
ALBUMIN/GLOB SERPL: 2 G/DL
ALP SERPL-CCNC: 66 U/L (ref 39–117)
ALT SERPL W P-5'-P-CCNC: 17 U/L (ref 1–33)
ANION GAP SERPL CALCULATED.3IONS-SCNC: 10.3 MMOL/L (ref 5–15)
AST SERPL-CCNC: 20 U/L (ref 1–32)
BASOPHILS # BLD AUTO: 0.04 10*3/MM3 (ref 0–0.2)
BASOPHILS NFR BLD AUTO: 0.9 % (ref 0–1.5)
BILIRUB SERPL-MCNC: 0.5 MG/DL (ref 0–1.2)
BUN SERPL-MCNC: 23 MG/DL (ref 8–23)
BUN/CREAT SERPL: 24 (ref 7–25)
CALCIUM SPEC-SCNC: 9.9 MG/DL (ref 8.6–10.5)
CHLORIDE SERPL-SCNC: 101 MMOL/L (ref 98–107)
CHOLEST SERPL-MCNC: 173 MG/DL (ref 0–200)
CO2 SERPL-SCNC: 26.7 MMOL/L (ref 22–29)
CREAT SERPL-MCNC: 0.96 MG/DL (ref 0.57–1)
DEPRECATED RDW RBC AUTO: 38.4 FL (ref 37–54)
EGFRCR SERPLBLD CKD-EPI 2021: 64.6 ML/MIN/1.73
EOSINOPHIL # BLD AUTO: 0.11 10*3/MM3 (ref 0–0.4)
EOSINOPHIL NFR BLD AUTO: 2.4 % (ref 0.3–6.2)
ERYTHROCYTE [DISTWIDTH] IN BLOOD BY AUTOMATED COUNT: 12.2 % (ref 12.3–15.4)
FOLATE SERPL-MCNC: 17.7 NG/ML (ref 4.78–24.2)
GLOBULIN UR ELPH-MCNC: 2.2 GM/DL
GLUCOSE SERPL-MCNC: 91 MG/DL (ref 65–99)
HCT VFR BLD AUTO: 34 % (ref 34–46.6)
HDLC SERPL-MCNC: 78 MG/DL (ref 40–60)
HGB BLD-MCNC: 11.6 G/DL (ref 12–15.9)
IMM GRANULOCYTES # BLD AUTO: 0.01 10*3/MM3 (ref 0–0.05)
IMM GRANULOCYTES NFR BLD AUTO: 0.2 % (ref 0–0.5)
LDLC SERPL CALC-MCNC: 86 MG/DL (ref 0–100)
LDLC/HDLC SERPL: 1.11 {RATIO}
LYMPHOCYTES # BLD AUTO: 2.13 10*3/MM3 (ref 0.7–3.1)
LYMPHOCYTES NFR BLD AUTO: 47.3 % (ref 19.6–45.3)
MCH RBC QN AUTO: 29.4 PG (ref 26.6–33)
MCHC RBC AUTO-ENTMCNC: 34.1 G/DL (ref 31.5–35.7)
MCV RBC AUTO: 86.3 FL (ref 79–97)
MONOCYTES # BLD AUTO: 0.37 10*3/MM3 (ref 0.1–0.9)
MONOCYTES NFR BLD AUTO: 8.2 % (ref 5–12)
NEUTROPHILS NFR BLD AUTO: 1.84 10*3/MM3 (ref 1.7–7)
NEUTROPHILS NFR BLD AUTO: 41 % (ref 42.7–76)
NRBC BLD AUTO-RTO: 0 /100 WBC (ref 0–0.2)
PLATELET # BLD AUTO: 254 10*3/MM3 (ref 140–450)
PMV BLD AUTO: 10.3 FL (ref 6–12)
POTASSIUM SERPL-SCNC: 3.9 MMOL/L (ref 3.5–5.2)
PROT SERPL-MCNC: 6.6 G/DL (ref 6–8.5)
RBC # BLD AUTO: 3.94 10*6/MM3 (ref 3.77–5.28)
SODIUM SERPL-SCNC: 138 MMOL/L (ref 136–145)
TRIGL SERPL-MCNC: 44 MG/DL (ref 0–150)
VIT B12 BLD-MCNC: 630 PG/ML (ref 211–946)
VLDLC SERPL-MCNC: 9 MG/DL (ref 5–40)
WBC NRBC COR # BLD: 4.5 10*3/MM3 (ref 3.4–10.8)

## 2023-04-28 PROCEDURE — 82306 VITAMIN D 25 HYDROXY: CPT

## 2023-04-28 PROCEDURE — 85025 COMPLETE CBC W/AUTO DIFF WBC: CPT

## 2023-04-28 PROCEDURE — 36415 COLL VENOUS BLD VENIPUNCTURE: CPT

## 2023-04-28 PROCEDURE — 80061 LIPID PANEL: CPT

## 2023-04-28 PROCEDURE — 82746 ASSAY OF FOLIC ACID SERUM: CPT

## 2023-04-28 PROCEDURE — 80053 COMPREHEN METABOLIC PANEL: CPT

## 2023-04-28 PROCEDURE — 82607 VITAMIN B-12: CPT

## 2023-05-01 PROBLEM — Z00.00 MEDICARE ANNUAL WELLNESS VISIT, SUBSEQUENT: Status: ACTIVE | Noted: 2023-05-01

## 2023-05-02 ENCOUNTER — OFFICE VISIT (OUTPATIENT)
Dept: INTERNAL MEDICINE | Facility: CLINIC | Age: 68
End: 2023-05-02
Payer: MEDICARE

## 2023-05-02 VITALS
BODY MASS INDEX: 23.68 KG/M2 | HEIGHT: 60 IN | TEMPERATURE: 98 F | DIASTOLIC BLOOD PRESSURE: 76 MMHG | WEIGHT: 120.6 LBS | HEART RATE: 99 BPM | SYSTOLIC BLOOD PRESSURE: 120 MMHG | OXYGEN SATURATION: 97 %

## 2023-05-02 DIAGNOSIS — D50.9 IRON DEFICIENCY ANEMIA, UNSPECIFIED IRON DEFICIENCY ANEMIA TYPE: ICD-10-CM

## 2023-05-02 DIAGNOSIS — E55.9 VITAMIN D DEFICIENCY: ICD-10-CM

## 2023-05-02 DIAGNOSIS — Z12.31 BREAST CANCER SCREENING BY MAMMOGRAM: ICD-10-CM

## 2023-05-02 DIAGNOSIS — Z23 NEED FOR VACCINATION: ICD-10-CM

## 2023-05-02 DIAGNOSIS — D12.2 ADENOMATOUS POLYP OF ASCENDING COLON: ICD-10-CM

## 2023-05-02 DIAGNOSIS — E78.2 MIXED HYPERLIPIDEMIA: ICD-10-CM

## 2023-05-02 DIAGNOSIS — Z00.00 MEDICARE ANNUAL WELLNESS VISIT, SUBSEQUENT: Primary | ICD-10-CM

## 2023-05-02 NOTE — ASSESSMENT & PLAN NOTE
Vitamin D of 37 is fine as of her 5/23 office visit.  She stable to continue just low-dose over-the-counter supplementation.

## 2023-05-02 NOTE — ASSESSMENT & PLAN NOTE
LDL is down from 95 to 86 as of her 4/23 office visit.  Patient stable to continue just moderate dose atorvastatin.

## 2023-05-02 NOTE — ASSESSMENT & PLAN NOTE
CHUYITA completed 5/23.  Patient remains very active and independent.  She has not had any falls nor hospitalizations in the past year.  She does not have a living well per se, but she wishes remain a full code.

## 2023-05-02 NOTE — PROGRESS NOTES
The ABCs of the Annual Wellness Visit  Subsequent Medicare Wellness Visit    Subjective      Giselle Tristan is a 68 y.o. female who presents for a Subsequent Medicare Wellness Visit.    The following portions of the patient's history were reviewed and   updated as appropriate: allergies, current medications, past family history, past medical history, past social history, past surgical history and problem list.    Compared to one year ago, the patient feels her physical   health is the same.    Compared to one year ago, the patient feels her mental   health is the same.    Recent Hospitalizations:  She was not admitted to the hospital during the last year.       Current Medical Providers:  Patient Care Team:  Juno Jerome MD as PCP - General (Internal Medicine)    Outpatient Medications Prior to Visit   Medication Sig Dispense Refill   • atorvastatin (LIPITOR) 20 MG tablet Take 1 tablet by mouth Daily. 90 tablet 3   • calcium carbonate-cholecalciferol 500-400 MG-UNIT tablet tablet Take 2 tablets by mouth 2 (Two) Times a Day With Meals. 360 tablet 1   • SUMAtriptan (Imitrex) 100 MG tablet Take one tablet at onset of headache. May repeat dose one time in 2 hours if headache not relieved. 20 tablet 0     No facility-administered medications prior to visit.       No opioid medication identified on active medication list. I have reviewed chart for other potential  high risk medication/s and harmful drug interactions in the elderly.          Aspirin is not on active medication list.  Aspirin use is not indicated based on review of current medical condition/s. Risk of harm outweighs potential benefits.  .    Patient Active Problem List   Diagnosis   • Mixed hyperlipidemia   • Primary osteoarthritis involving multiple joints   • Vitamin D deficiency   • Recurrent major depression in partial remission   • Leukopenia   • Osteopenia of multiple sites   • Iron deficiency anemia   • Allergic rhinitis due to pollen   •  "Impaired fasting glucose   • Adenomatous polyp of ascending colon   • Migraine with aura and without status migrainosus, not intractable   • Medicare annual wellness visit, subsequent     Advance Care Planning   Advance Care Planning     Advance Directive is not on file.  ACP discussion was held with the patient during this visit. Patient does not have an advance directive, information provided.     Objective    Vitals:    23 1300   BP: 120/76   Pulse: 99   Temp: 98 °F (36.7 °C)   SpO2: 97%   Weight: 54.7 kg (120 lb 9.6 oz)   Height: 152.4 cm (60\")     Estimated body mass index is 23.55 kg/m² as calculated from the following:    Height as of this encounter: 152.4 cm (60\").    Weight as of this encounter: 54.7 kg (120 lb 9.6 oz).    BMI is within normal parameters. No other follow-up for BMI required.      Does the patient have evidence of cognitive impairment?   No    Lab Results   Component Value Date    TRIG 44 2023    HDL 78 (H) 2023    LDL 86 2023    VLDL 9 2023          HEALTH RISK ASSESSMENT    Smoking Status:  Social History     Tobacco Use   Smoking Status Never   Smokeless Tobacco Never     Alcohol Consumption:  Social History     Substance and Sexual Activity   Alcohol Use Never     Fall Risk Screen:    LYNNE Fall Risk Assessment was completed, and patient is at LOW risk for falls.Assessment completed on:2023    Depression Screenin/2/2023     1:32 PM   PHQ-2/PHQ-9 Depression Screening   Little Interest or Pleasure in Doing Things 0-->not at all   Feeling Down, Depressed or Hopeless 0-->not at all   PHQ-9: Brief Depression Severity Measure Score 0       Health Habits and Functional and Cognitive Screenin/2/2023     1:00 PM   Functional & Cognitive Status   Do you have difficulty preparing food and eating? No   Do you have difficulty bathing yourself, getting dressed or grooming yourself? No   Do you have difficulty using the toilet? No   Do you have " difficulty moving around from place to place? No   Do you have trouble with steps or getting out of a bed or a chair? No   Current Diet Well Balanced Diet   Dental Exam Not up to date   Eye Exam Not up to date   Exercise (times per week) 7 times per week   Current Exercises Include Walking   Do you need help using the phone?  No   Are you deaf or do you have serious difficulty hearing?  No   Do you need help with transportation? No   Do you need help shopping? No   Do you need help preparing meals?  No   Do you need help with housework?  No   Do you need help with laundry? No   Do you need help taking your medications? No   Do you need help managing money? No   Do you ever drive or ride in a car without wearing a seat belt? No   Do you have difficulty concentrating, remembering or making decisions? No       Age-appropriate Screening Schedule:  Refer to the list below for future screening recommendations based on patient's age, sex and/or medical conditions. Orders for these recommended tests are listed in the plan section. The patient has been provided with a written plan.    Health Maintenance   Topic Date Due   • ZOSTER VACCINE (1 of 2) Never done   • Pneumococcal Vaccine 65+ (1 - PCV) Never done   • ANNUAL WELLNESS VISIT  Never done   • COVID-19 Vaccine (3 - Booster for Pfizer series) 09/04/2023 (Originally 10/20/2021)   • INFLUENZA VACCINE  08/01/2023   • LIPID PANEL  04/28/2024   • MAMMOGRAM  07/27/2024   • DXA SCAN  07/27/2024   • COLORECTAL CANCER SCREENING  12/09/2027   • TDAP/TD VACCINES (2 - Td or Tdap) 09/17/2028   • HEPATITIS C SCREENING  Completed                  CMS Preventative Services Quick Reference  Risk Factors Identified During Encounter:    None Identified    The above risks/problems have been discussed with the patient.  Pertinent information has been shared with the patient in the After Visit Summary.    Diagnoses and all orders for this visit:    1. Medicare annual wellness visit,  subsequent (Primary)  Assessment & Plan:  AWV completed 5/23.  Patient remains very active and independent.  She has not had any falls nor hospitalizations in the past year.  She does not have a living well per se, but she wishes remain a full code.    Orders:  -     TSH+Free T4; Future    2. Mixed hyperlipidemia  Assessment & Plan:  LDL is down from 95 to 86 as of her 4/23 office visit.  Patient stable to continue just moderate dose atorvastatin.    Orders:  -     Comprehensive Metabolic Panel; Future  -     Lipid Panel; Future    3. Adenomatous polyp of ascending colon  Assessment & Plan:  Reviewed above at 5/23 OV.      4. Vitamin D deficiency  Assessment & Plan:  Vitamin D of 37 is fine as of her 5/23 office visit.  She stable to continue just low-dose over-the-counter supplementation.      5. Breast cancer screening by mammogram  -     Mammo Screening Digital Tomosynthesis Bilateral With CAD; Future    6. Iron deficiency anemia, unspecified iron deficiency anemia type  -     CBC & Differential; Future  -     Ferritin; Future  -     Iron Profile; Future      Follow Up:   Next Medicare Wellness visit to be scheduled in 1 year.      An After Visit Summary and PPPS were made available to the patient.

## 2023-05-02 NOTE — PROGRESS NOTES
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  We will go over her med list, review any labs obtained in detail together, and address any new concerns as time permits.    Review of Systems   Constitutional: Negative for appetite change, fatigue and fever.   HENT: Negative for congestion and ear pain.    Eyes: Negative for blurred vision.   Respiratory: Negative for cough, chest tightness, shortness of breath and wheezing.    Cardiovascular: Negative for chest pain, palpitations and leg swelling.   Gastrointestinal: Negative for abdominal pain.   Genitourinary: Negative for difficulty urinating, dysuria and hematuria.   Musculoskeletal: Negative for arthralgias and gait problem.   Skin: Negative for skin lesions.   Neurological: Negative for syncope, memory problem and confusion.   Psychiatric/Behavioral: Negative for self-injury and depressed mood.       Objective   Vital Signs:   /76   Pulse 99   Temp 98 °F (36.7 °C)   Ht 152.4 cm (60\")   Wt 54.7 kg (120 lb 9.6 oz)   SpO2 97%   BMI 23.55 kg/m²           Physical Exam  Vitals and nursing note reviewed.   Constitutional:       General: She is not in acute distress.     Appearance: Normal appearance. She is not toxic-appearing.   HENT:      Head: Atraumatic.      Right Ear: External ear normal.      Left Ear: External ear normal.      Nose: Nose normal.      Mouth/Throat:      Mouth: Mucous membranes are moist.   Eyes:      General:         Right eye: No discharge.         Left eye: No discharge.      Extraocular Movements: Extraocular movements intact.      " Pupils: Pupils are equal, round, and reactive to light.   Cardiovascular:      Rate and Rhythm: Normal rate and regular rhythm.      Pulses: Normal pulses.      Heart sounds: Normal heart sounds. No murmur heard.    No gallop.   Pulmonary:      Effort: Pulmonary effort is normal. No respiratory distress.      Breath sounds: No wheezing, rhonchi or rales.   Abdominal:      General: There is no distension.      Palpations: Abdomen is soft. There is no mass.      Tenderness: There is no abdominal tenderness. There is no guarding.   Musculoskeletal:         General: No swelling or tenderness.      Cervical back: No tenderness.      Right lower leg: No edema.      Left lower leg: No edema.   Skin:     General: Skin is warm and dry.      Findings: No rash.   Neurological:      General: No focal deficit present.      Mental Status: She is alert and oriented to person, place, and time. Mental status is at baseline.      Motor: No weakness.      Gait: Gait normal.   Psychiatric:         Mood and Affect: Mood normal.         Thought Content: Thought content normal.          Result Review   The following data was reviewed by: Juno Jerome MD on 04/27/2022:  [x] Laboratory  [] Microbiology  [] Radiology  [] EKG/telemetry  [] Cardiology/Vascular  [] Pathology  [x] Old records             Assessment and Plan   Diagnoses and all orders for this visit:    1. Medicare annual wellness visit, subsequent (Primary)  Assessment & Plan:  AWV completed 5/23.  Patient remains very active and independent.  She has not had any falls nor hospitalizations in the past year.  She does not have a living well per se, but she wishes remain a full code.    Orders:  -     TSH+Free T4; Future    2. Mixed hyperlipidemia  Assessment & Plan:  LDL is down from 95 to 86 as of her 4/23 office visit.  Patient stable to continue just moderate dose atorvastatin.    Orders:  -     Comprehensive Metabolic Panel; Future  -     Lipid Panel; Future    3.  Adenomatous polyp of ascending colon  Overview:  C-SCOPE 12/22:  polyp/FH+ = 5 yrs per Dr Delgado.    Assessment & Plan:  Reviewed above at 5/23 OV.      4. Vitamin D deficiency  Assessment & Plan:  Vitamin D of 37 is fine as of her 5/23 office visit.  She stable to continue just low-dose over-the-counter supplementation.      5. Breast cancer screening by mammogram  -     Mammo Screening Digital Tomosynthesis Bilateral With CAD; Future    6. Iron deficiency anemia, unspecified iron deficiency anemia type  -     CBC & Differential; Future  -     Ferritin; Future  -     Iron Profile; Future    7. Need for vaccination    Other orders  -     Pneumococcal Conjugate Vaccine 20-Valent (PCV20)    --  --  OLDER NOTES:  VISIT 6/21:  ANNUAL PHYSICAL 7/20 and d/w all labs in detail; no ischemic sx's at work/home.  --  HYPOTHYROIDISM is new as of 2/18 OV=no sxs, so will repeat...wnl as of 8/18 OV...fine 7/19 w/o meds...ditto 7/20---> wnl 6/21.  --  ANXIETY D/O = f/u on SSRI past 6 weeks or so, took off a week, daughter who is on disability moved out into [facility], no car/drivers license=they got custody of 7 yo grand-daughter, apparently this daughter has schizophrenia just like her son; so, stress is understandable, but does feel better on Celexa=calmer...pleasant---> no new concerns 6/21  WT LOSS of 4 lbs after down a few prior to above; no N/V/blood in stool; asked her to monitor at home...down 10 total to 114 and I d/w eat more please...stable at least--->120's holding 6/21.  --  LEUKOPENIA 1/17 is new and will repeat 6 mo...4.8 is better/fine, and has been lower prior actually; all lines wnl...4.1 with nl diff...4.5 is same 7/20, but need to keep eye on Lymphs---> wnl 6/21.  ANEMIA = 10.9 as of 7/19 OV, so will get more labs on RTO...better 7/20--->same 6/21 and not interested in c-scope.  --  LIPIDS.../HDL 90 so d/w tx on RTO if the same...152 and d/w stop the strict diet as above; ? tx if back up...130  "fine...131 is holding/ HDL 70's...153 again and I rec tx as of 7/19 OV...92 is nice drop=repeat on RTO fall '20...133 and reports c/w, so needs 20 mg now---> 149 in 6/21 and says it's too expensive; so I printed out $12 coupon for 90 days.  --  MIGRANE H/A...stable...3 pills/month...less than that recently...about 4-5x/month is current/typical average...less at 11/19 OV, but she doesn't want any preventive meds (Had MRI per ENT 9/19 for hearing loss).  \"SPELLS\" per HPI, exam is benign, 1 spell at work, 1 when driving, ? panic attack and d/w call if recurrent...still no c/o presently.  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  --  VIT D DEF stable=33...25 and I d/w her 2/18---> 39 in 7/20 = RTO.  OSTEOPENIA...BMD 11/16...spine -1.3, hip -1.6...9/19 = spine -1.3, hip -1.7---> needed fall '21.  --  L KNEE PAIN 7/20 with neg xray per urgent care and I d/w eval per Ortho now since pain when she is on it; wear ace sleeve.  --   MMG 7/27/2022 per me.  COLON 12/22...polyp/FH+ = 5 yrs per Dr Delgado.  Adacel 8/18;   ( #2 at age 39, 1 girl still trying to be NP in [place] as of 2/18 OV, has 3 kids; other girl bipolar/schizo, 1 son with ? schizo vs depression...they live here...raising 13 yo grand-daughter as of 5/23 OV; Mom passed 60's with Lupus, F 96 and lives alone in [place], [place] as of 5/23; her sister lives nearby; has brother as well).    Follow Up   Return in about 6 months (around 11/2/2023).  Patient was given instructions and counseling regarding her condition or for health maintenance advice. Please see specific information pulled into the AVS if appropriate.       "

## 2023-05-12 ENCOUNTER — TELEPHONE (OUTPATIENT)
Dept: INTERNAL MEDICINE | Facility: CLINIC | Age: 68
End: 2023-05-12
Payer: MEDICARE

## 2023-05-12 NOTE — TELEPHONE ENCOUNTER
Caller: Giselle Tristan    Relationship: Self    Best call back number: [phone]    What is the best time to reach you: ANYTIME    Who are you requesting to speak with (clinical staff, provider,  specific staff member): CLINICAL    Do you know the name of the person who called: NO MESSAGE WAS LEFT     What was the call regarding: UNKNOWN    Do you require a callback: PATIENT STATES SHE HAS MISSED 3 CALLS FROM THE OFFICE AND NO ONE LEFT A MESSAGE. PATIENT IS ASKING FOR A CALL BACK AND STATES TO LEAVE A DETAILED MESSAGE IF NO ANSWER.

## 2023-05-15 NOTE — TELEPHONE ENCOUNTER
Call pt lv to call us back      Patient needs to contact scheduling for her mammogram [phone] opt 3    Ok for hub to read

## 2023-11-07 ENCOUNTER — OFFICE VISIT (OUTPATIENT)
Dept: INTERNAL MEDICINE | Facility: CLINIC | Age: 68
End: 2023-11-07
Payer: MEDICARE

## 2023-11-07 VITALS
SYSTOLIC BLOOD PRESSURE: 120 MMHG | OXYGEN SATURATION: 97 % | TEMPERATURE: 97.8 F | BODY MASS INDEX: 24.62 KG/M2 | WEIGHT: 125.4 LBS | HEIGHT: 60 IN | HEART RATE: 83 BPM | DIASTOLIC BLOOD PRESSURE: 80 MMHG

## 2023-11-07 DIAGNOSIS — M15.9 PRIMARY OSTEOARTHRITIS INVOLVING MULTIPLE JOINTS: ICD-10-CM

## 2023-11-07 DIAGNOSIS — M85.89 OSTEOPENIA OF MULTIPLE SITES: ICD-10-CM

## 2023-11-07 DIAGNOSIS — D50.8 OTHER IRON DEFICIENCY ANEMIA: ICD-10-CM

## 2023-11-07 DIAGNOSIS — G43.109 MIGRAINE WITH AURA AND WITHOUT STATUS MIGRAINOSUS, NOT INTRACTABLE: ICD-10-CM

## 2023-11-07 DIAGNOSIS — E78.2 MIXED HYPERLIPIDEMIA: Primary | ICD-10-CM

## 2023-11-07 PROCEDURE — 1160F RVW MEDS BY RX/DR IN RCRD: CPT | Performed by: INTERNAL MEDICINE

## 2023-11-07 PROCEDURE — 99214 OFFICE O/P EST MOD 30 MIN: CPT | Performed by: INTERNAL MEDICINE

## 2023-11-07 PROCEDURE — 1159F MED LIST DOCD IN RCRD: CPT | Performed by: INTERNAL MEDICINE

## 2023-11-07 RX ORDER — SUMATRIPTAN 100 MG/1
TABLET, FILM COATED ORAL
Qty: 20 TABLET | Refills: 0 | Status: SHIPPED | OUTPATIENT
Start: 2023-11-07

## 2023-11-07 NOTE — PATIENT INSTRUCTIONS
As we discussed, do your labs before long, and call for the results.    2.  You can call and see about rescheduling her mammogram, if they require another order, just give us a call.    3.  Assuming no surprises on your blood test, we will not need labs for another 6 months, please call about a week before your appointment so I can put orders in.

## 2023-11-07 NOTE — ASSESSMENT & PLAN NOTE
Patient with scattered arthritic type pains, nothing persistent, she does not require any chronic medications for that as of her 11/23 office visit.

## 2023-11-07 NOTE — PROGRESS NOTES
"Chief Complaint  Hyperlipidemia and Follow-up (Pt states that this is routine, she didn't have labs or her mammogram/She states that she didn't have labs. 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  We will go over her med list, review any labs obtained in detail together, and address any new concerns as time permits.    Review of Systems   Constitutional:  Negative for appetite change, fatigue and fever.   HENT:  Negative for congestion and ear pain.    Eyes:  Negative for blurred vision.   Respiratory:  Negative for cough, chest tightness, shortness of breath and wheezing.    Cardiovascular:  Negative for chest pain, palpitations and leg swelling.   Gastrointestinal:  Negative for abdominal pain.   Genitourinary:  Negative for difficulty urinating, dysuria and hematuria.   Musculoskeletal:  Negative for arthralgias and gait problem.   Skin:  Negative for skin lesions.   Neurological:  Negative for syncope, memory problem and confusion.   Psychiatric/Behavioral:  Negative for self-injury and depressed mood.        Objective   Vital Signs:   /80   Pulse 83   Temp 97.8 °F (36.6 °C) (Skin)   Ht 152.4 cm (60\")   Wt 56.9 kg (125 lb 6.4 oz)   SpO2 97%   BMI 24.49 kg/m²           Physical Exam  Vitals and nursing note reviewed.   Constitutional:       General: She is not in acute distress.     Appearance: Normal appearance. She is not toxic-appearing.   HENT:      Head: Atraumatic.      Right Ear: External ear normal.      Left Ear: External ear normal.      Nose: Nose normal.      Mouth/Throat:      Mouth: Mucous membranes are moist.   Eyes:      General:         Right eye: No discharge.         Left eye: No discharge.      Extraocular Movements: Extraocular movements " intact.      Pupils: Pupils are equal, round, and reactive to light.   Cardiovascular:      Rate and Rhythm: Normal rate and regular rhythm.      Pulses: Normal pulses.      Heart sounds: Normal heart sounds. No murmur heard.     No gallop.   Pulmonary:      Effort: Pulmonary effort is normal. No respiratory distress.      Breath sounds: No wheezing, rhonchi or rales.   Abdominal:      General: There is no distension.      Palpations: Abdomen is soft. There is no mass.      Tenderness: There is no abdominal tenderness. There is no guarding.   Musculoskeletal:         General: No swelling or tenderness.      Cervical back: No tenderness.      Right lower leg: No edema.      Left lower leg: No edema.   Skin:     General: Skin is warm and dry.      Findings: No rash.   Neurological:      General: No focal deficit present.      Mental Status: She is alert and oriented to person, place, and time. Mental status is at baseline.      Motor: No weakness.      Gait: Gait normal.   Psychiatric:         Mood and Affect: Mood normal.         Thought Content: Thought content normal.          Result Review   The following data was reviewed by: Juno Jerome MD on 04/27/2022:  [x] Laboratory  [] Microbiology  [] Radiology  [] EKG/telemetry  [] Cardiology/Vascular  [] Pathology  [x] Old records             Assessment and Plan   Diagnoses and all orders for this visit:    1. Mixed hyperlipidemia (Primary)  Assessment & Plan:  LDL was fine at 86 in 4/23.  Patient will do labs soon as of her 11/23 office visit, will make further recommendations at that time.  Otherwise she is stable to continue with just low to moderate dose atorvastatin.      2. Migraine with aura and without status migrainosus, not intractable  Assessment & Plan:  Patient stable this regards as of 11/23.  The pattern has not increased, and Imitrex is still beneficial, continue current plan of care.  Refill sent.      3. Primary osteoarthritis involving multiple  joints  Assessment & Plan:  Patient with scattered arthritic type pains, nothing persistent, she does not require any chronic medications for that as of her 11/23 office visit.      4. Other iron deficiency anemia  Assessment & Plan:  Labs pending as of 11/23.      5. Osteopenia of multiple sites  Overview:  BMD 7/22:  Spine -1.3  Hip -1.7    BMD 9/19:  Spine -1.3  Hip -1.7          Assessment & Plan:  Recommend patient continue with over-the-counter vitamin D and calcium supplementation as well as with routine physical activity as of her 11/23 OV.       Other orders  -     SUMAtriptan (Imitrex) 100 MG tablet; Take one tablet at onset of headache. May repeat dose one time in 2 hours if headache not relieved.  Dispense: 20 tablet; Refill: 0        --  --  OLDER NOTES:  VISIT 6/21:  ANNUAL PHYSICAL 7/20 and d/w all labs in detail; no ischemic sx's at work/home.  --  HYPOTHYROIDISM is new as of 2/18 OV=no sxs, so will repeat...wnl as of 8/18 OV...fine 7/19 w/o meds...ditto 7/20---> wnl 6/21.  --  ANXIETY D/O = f/u on SSRI past 6 weeks or so, took off a week, daughter who is on disability moved out into [facility], no car/drivers license=they got custody of 7 yo grand-daughter, apparently this daughter has schizophrenia just like her son; so, stress is understandable, but does feel better on Celexa=calmer...pleasant---> no new concerns 6/21  WT LOSS of 4 lbs after down a few prior to above; no N/V/blood in stool; asked her to monitor at home...down 10 total to 114 and I d/w eat more please...stable at least--->120's holding 6/21.  --  LEUKOPENIA 1/17 is new and will repeat 6 mo...4.8 is better/fine, and has been lower prior actually; all lines wnl...4.1 with nl diff...4.5 is same 7/20, but need to keep eye on Lymphs---> wnl 6/21.  ANEMIA = 10.9 as of 7/19 OV, so will get more labs on RTO...better 7/20--->same 6/21 and not interested in c-scope.  --  LIPIDS.../HDL 90 so d/w tx on RTO if the same...152 and d/w  "stop the strict diet as above; ? tx if back up...130 fine...131 is holding/ HDL 70's...153 again and I rec tx as of 7/19 OV...92 is nice drop=repeat on RTO fall '20...133 and reports c/w, so needs 20 mg now---> 149 in 6/21 and says it's too expensive; so I printed out $12 coupon for 90 days.  --  MIGRANE H/A...stable...3 pills/month...less than that recently...about 4-5x/month is current/typical average...less at 11/19 OV, but she doesn't want any preventive meds (Had MRI per ENT 9/19 for hearing loss).  \"SPELLS\" per HPI, exam is benign, 1 spell at work, 1 when driving, ? panic attack and d/w call if recurrent...still no c/o presently.  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  --  VIT D DEF stable=33...25 and I d/w her 2/18---> 39 in 7/20 = RTO.  OSTEOPENIA...BMD 11/16...spine -1.3, hip -1.6...9/19 = spine -1.3, hip -1.7---> needed fall '21.  --  L KNEE PAIN 7/20 with neg xray per urgent care and I d/w eval per Ortho now since pain when she is on it; wear ace sleeve.  --   MMG 7/27/2022 per me.  COLON 12/22...polyp/FH+ = 5 yrs per Dr Delgaod.  Adacel 8/18;   ( #2 at age 39, 1 girl still trying to be NP in [place] as of 2/18 OV, has 3 kids; other girl bipolar/schizo, 1 son with ? schizo vs depression...they live here...raising 15 yo grand-daughter as of 5/23 OV; Mom passed 60's with Lupus, F passed 97 in 9/23 and she cared for him with Hospice, he was farmer/preacher; S/B = no changes 11/23).    Follow Up   Return in about 6 months (around 5/7/2024).  Patient was given instructions and counseling regarding her condition or for health maintenance advice. Please see specific information pulled into the AVS if appropriate.       "

## 2023-11-07 NOTE — ASSESSMENT & PLAN NOTE
LDL was fine at 86 in 4/23.  Patient will do labs soon as of her 11/23 office visit, will make further recommendations at that time.  Otherwise she is stable to continue with just low to moderate dose atorvastatin.

## 2023-11-07 NOTE — ASSESSMENT & PLAN NOTE
Recommend patient continue with over-the-counter vitamin D and calcium supplementation as well as with routine physical activity as of her 11/23 OV.

## 2023-11-07 NOTE — ASSESSMENT & PLAN NOTE
Patient stable this regards as of 11/23.  The pattern has not increased, and Imitrex is still beneficial, continue current plan of care.  Refill sent.

## 2023-11-14 ENCOUNTER — LAB (OUTPATIENT)
Dept: LAB | Facility: HOSPITAL | Age: 68
End: 2023-11-14
Payer: MEDICARE

## 2023-11-14 ENCOUNTER — HOSPITAL ENCOUNTER (OUTPATIENT)
Dept: MAMMOGRAPHY | Facility: HOSPITAL | Age: 68
Discharge: HOME | End: 2023-11-14
Admitting: INTERNAL MEDICINE
Payer: MEDICARE

## 2023-11-14 DIAGNOSIS — D50.9 IRON DEFICIENCY ANEMIA, UNSPECIFIED IRON DEFICIENCY ANEMIA TYPE: ICD-10-CM

## 2023-11-14 DIAGNOSIS — Z12.31 BREAST CANCER SCREENING BY MAMMOGRAM: ICD-10-CM

## 2023-11-14 DIAGNOSIS — E78.2 MIXED HYPERLIPIDEMIA: ICD-10-CM

## 2023-11-14 DIAGNOSIS — Z00.00 MEDICARE ANNUAL WELLNESS VISIT, SUBSEQUENT: ICD-10-CM

## 2023-11-14 LAB
ALBUMIN SERPL-MCNC: 4.5 G/DL (ref 3.5–5.2)
ALBUMIN/GLOB SERPL: 1.7 G/DL
ALP SERPL-CCNC: 67 U/L (ref 39–117)
ALT SERPL W P-5'-P-CCNC: 14 U/L (ref 1–33)
ANION GAP SERPL CALCULATED.3IONS-SCNC: 10.3 MMOL/L (ref 5–15)
AST SERPL-CCNC: 17 U/L (ref 1–32)
BASOPHILS # BLD AUTO: 0.04 10*3/MM3 (ref 0–0.2)
BASOPHILS NFR BLD AUTO: 0.8 % (ref 0–1.5)
BILIRUB SERPL-MCNC: 0.4 MG/DL (ref 0–1.2)
BUN SERPL-MCNC: 22 MG/DL (ref 8–23)
BUN/CREAT SERPL: 22.2 (ref 7–25)
CALCIUM SPEC-SCNC: 9.7 MG/DL (ref 8.6–10.5)
CHLORIDE SERPL-SCNC: 104 MMOL/L (ref 98–107)
CHOLEST SERPL-MCNC: 223 MG/DL (ref 0–200)
CO2 SERPL-SCNC: 24.7 MMOL/L (ref 22–29)
CREAT SERPL-MCNC: 0.99 MG/DL (ref 0.57–1)
DEPRECATED RDW RBC AUTO: 37.7 FL (ref 37–54)
EGFRCR SERPLBLD CKD-EPI 2021: 62.2 ML/MIN/1.73
EOSINOPHIL # BLD AUTO: 0.09 10*3/MM3 (ref 0–0.4)
EOSINOPHIL NFR BLD AUTO: 1.8 % (ref 0.3–6.2)
ERYTHROCYTE [DISTWIDTH] IN BLOOD BY AUTOMATED COUNT: 11.9 % (ref 12.3–15.4)
FERRITIN SERPL-MCNC: 91.7 NG/ML (ref 13–150)
GLOBULIN UR ELPH-MCNC: 2.6 GM/DL
GLUCOSE SERPL-MCNC: 84 MG/DL (ref 65–99)
HCT VFR BLD AUTO: 36.2 % (ref 34–46.6)
HDLC SERPL-MCNC: 83 MG/DL (ref 40–60)
HGB BLD-MCNC: 12.1 G/DL (ref 12–15.9)
IMM GRANULOCYTES # BLD AUTO: 0.01 10*3/MM3 (ref 0–0.05)
IMM GRANULOCYTES NFR BLD AUTO: 0.2 % (ref 0–0.5)
IRON 24H UR-MRATE: 93 MCG/DL (ref 37–145)
IRON SATN MFR SERPL: 25 % (ref 20–50)
LDLC SERPL CALC-MCNC: 130 MG/DL (ref 0–100)
LDLC/HDLC SERPL: 1.54 {RATIO}
LYMPHOCYTES # BLD AUTO: 1.96 10*3/MM3 (ref 0.7–3.1)
LYMPHOCYTES NFR BLD AUTO: 38.4 % (ref 19.6–45.3)
MCH RBC QN AUTO: 29.2 PG (ref 26.6–33)
MCHC RBC AUTO-ENTMCNC: 33.4 G/DL (ref 31.5–35.7)
MCV RBC AUTO: 87.4 FL (ref 79–97)
MONOCYTES # BLD AUTO: 0.36 10*3/MM3 (ref 0.1–0.9)
MONOCYTES NFR BLD AUTO: 7.1 % (ref 5–12)
NEUTROPHILS NFR BLD AUTO: 2.64 10*3/MM3 (ref 1.7–7)
NEUTROPHILS NFR BLD AUTO: 51.7 % (ref 42.7–76)
NRBC BLD AUTO-RTO: 0 /100 WBC (ref 0–0.2)
PLATELET # BLD AUTO: 304 10*3/MM3 (ref 140–450)
PMV BLD AUTO: 10.4 FL (ref 6–12)
POTASSIUM SERPL-SCNC: 4.1 MMOL/L (ref 3.5–5.2)
PROT SERPL-MCNC: 7.1 G/DL (ref 6–8.5)
RBC # BLD AUTO: 4.14 10*6/MM3 (ref 3.77–5.28)
SODIUM SERPL-SCNC: 139 MMOL/L (ref 136–145)
T4 FREE SERPL-MCNC: 1.28 NG/DL (ref 0.93–1.7)
TIBC SERPL-MCNC: 374 MCG/DL (ref 298–536)
TRANSFERRIN SERPL-MCNC: 251 MG/DL (ref 200–360)
TRIGL SERPL-MCNC: 59 MG/DL (ref 0–150)
TSH SERPL DL<=0.05 MIU/L-ACNC: 2.86 UIU/ML (ref 0.27–4.2)
VLDLC SERPL-MCNC: 10 MG/DL (ref 5–40)
WBC NRBC COR # BLD: 5.1 10*3/MM3 (ref 3.4–10.8)

## 2023-11-14 PROCEDURE — 85025 COMPLETE CBC W/AUTO DIFF WBC: CPT

## 2023-11-14 PROCEDURE — 80053 COMPREHEN METABOLIC PANEL: CPT

## 2023-11-14 PROCEDURE — 84439 ASSAY OF FREE THYROXINE: CPT

## 2023-11-14 PROCEDURE — 84466 ASSAY OF TRANSFERRIN: CPT

## 2023-11-14 PROCEDURE — 80061 LIPID PANEL: CPT

## 2023-11-14 PROCEDURE — 36415 COLL VENOUS BLD VENIPUNCTURE: CPT

## 2023-11-14 PROCEDURE — 77067 SCR MAMMO BI INCL CAD: CPT

## 2023-11-14 PROCEDURE — 84443 ASSAY THYROID STIM HORMONE: CPT

## 2023-11-14 PROCEDURE — 83540 ASSAY OF IRON: CPT

## 2023-11-14 PROCEDURE — 82728 ASSAY OF FERRITIN: CPT

## 2023-11-14 PROCEDURE — 77063 BREAST TOMOSYNTHESIS BI: CPT

## 2024-05-21 ENCOUNTER — OFFICE VISIT (OUTPATIENT)
Dept: INTERNAL MEDICINE | Age: 69
End: 2024-05-21
Payer: MEDICARE

## 2024-05-21 VITALS
SYSTOLIC BLOOD PRESSURE: 120 MMHG | WEIGHT: 125 LBS | HEART RATE: 78 BPM | DIASTOLIC BLOOD PRESSURE: 80 MMHG | OXYGEN SATURATION: 93 % | BODY MASS INDEX: 24.54 KG/M2 | TEMPERATURE: 97 F | HEIGHT: 60 IN

## 2024-05-21 DIAGNOSIS — G43.109 MIGRAINE WITH AURA AND WITHOUT STATUS MIGRAINOSUS, NOT INTRACTABLE: ICD-10-CM

## 2024-05-21 DIAGNOSIS — M15.9 PRIMARY OSTEOARTHRITIS INVOLVING MULTIPLE JOINTS: ICD-10-CM

## 2024-05-21 DIAGNOSIS — Z00.00 MEDICARE ANNUAL WELLNESS VISIT, SUBSEQUENT: Primary | ICD-10-CM

## 2024-05-21 DIAGNOSIS — E78.2 MIXED HYPERLIPIDEMIA: ICD-10-CM

## 2024-05-21 NOTE — ASSESSMENT & PLAN NOTE
Appears patient is very stable this regards as of 5/24.  She has Imitrex available if needed, but has been able to abort the major headaches with Tylenol here lately.  Blood pressure is a nonissue as well.

## 2024-05-21 NOTE — PROGRESS NOTES
The ABCs of the Annual Wellness Visit  Subsequent Medicare Wellness Visit    Subjective      Giselle Tristan is a 69 y.o. female who presents for a Subsequent Medicare Wellness Visit.    The following portions of the patient's history were reviewed and   updated as appropriate: allergies, current medications, past family history, past medical history, past social history, past surgical history, and problem list.    Compared to one year ago, the patient feels her physical   health is the same.    Compared to one year ago, the patient feels her mental   health is the same.    Recent Hospitalizations:  She was not admitted to the hospital during the last year.       Current Medical Providers:  Patient Care Team:  Juno Jerome MD as PCP - General (Internal Medicine)    Outpatient Medications Prior to Visit   Medication Sig Dispense Refill    calcium carbonate-cholecalciferol 500-400 MG-UNIT tablet tablet Take 2 tablets by mouth 2 (Two) Times a Day With Meals. 360 tablet 1    SUMAtriptan (Imitrex) 100 MG tablet Take one tablet at onset of headache. May repeat dose one time in 2 hours if headache not relieved. 20 tablet 0    atorvastatin (LIPITOR) 20 MG tablet Take 1 tablet by mouth Daily. 90 tablet 3     No facility-administered medications prior to visit.       No opioid medication identified on active medication list. I have reviewed chart for other potential  high risk medication/s and harmful drug interactions in the elderly.        Aspirin is not on active medication list.  Aspirin use is not indicated based on review of current medical condition/s. Risk of harm outweighs potential benefits.  .    Patient Active Problem List   Diagnosis    Mixed hyperlipidemia    Primary osteoarthritis involving multiple joints    Vitamin D deficiency    Recurrent major depression in partial remission    Leukopenia    Osteopenia of multiple sites    Iron deficiency anemia    Allergic rhinitis due to pollen    Impaired fasting  "glucose    Adenomatous polyp of ascending colon    Migraine with aura and without status migrainosus, not intractable    Medicare annual wellness visit, subsequent     Advance Care Planning   Advance Care Planning     Advance Directive is not on file.  ACP discussion was held with the patient during this visit. Patient does not have an advance directive, information provided.     Objective    Vitals:    24 1346   BP: 120/80   BP Location: Left arm   Patient Position: Sitting   Pulse: 78   Temp: 97 °F (36.1 °C)   TempSrc: Temporal   SpO2: 93%   Weight: 56.7 kg (125 lb)   Height: 152.4 cm (60\")     Estimated body mass index is 24.41 kg/m² as calculated from the following:    Height as of this encounter: 152.4 cm (60\").    Weight as of this encounter: 56.7 kg (125 lb).    BMI is within normal parameters. No other follow-up for BMI required.      Does the patient have evidence of cognitive impairment?   No            HEALTH RISK ASSESSMENT    Smoking Status:  Social History     Tobacco Use   Smoking Status Never   Smokeless Tobacco Never     Alcohol Consumption:  Social History     Substance and Sexual Activity   Alcohol Use Never     Fall Risk Screen:    STEADI Fall Risk Assessment was completed, and patient is at LOW risk for falls.Assessment completed on:2024    Depression Screenin/21/2024     1:52 PM   PHQ-2/PHQ-9 Depression Screening   Little Interest or Pleasure in Doing Things 0-->not at all   Feeling Down, Depressed or Hopeless 0-->not at all   Trouble Falling or Staying Asleep, or Sleeping Too Much 0-->not at all   Feeling Tired or Having Little Energy 0-->not at all   Poor Appetite or Overeating 0-->not at all   Feeling Bad about Yourself - or that You are a Failure or Have Let Yourself or Your Family Down 0-->not at all   Trouble Concentrating on Things, Such as Reading the Newspaper or Watching Television 0-->not at all   Moving or Speaking So Slowly that Other People Could Have Noticed? " Or the Opposite - Being So Fidgety 0-->not at all   Thoughts that You Would be Better Off Dead or of Hurting Yourself in Some Way 0-->not at all   PHQ-9: Brief Depression Severity Measure Score 0   If You Checked Off Any Problems, How Difficult Have These Problems Made It For You to Do Your Work, Take Care of Things at Home, or Get Along with Other People? not difficult at all       Health Habits and Functional and Cognitive Screenin/21/2024     1:53 PM   Functional & Cognitive Status   Do you have difficulty preparing food and eating? No   Do you have difficulty bathing yourself, getting dressed or grooming yourself? No   Do you have difficulty using the toilet? No   Do you have difficulty moving around from place to place? No   Do you have trouble with steps or getting out of a bed or a chair? No   Current Diet Well Balanced Diet   Dental Exam Not up to date   Eye Exam Up to date   Exercise (times per week) 5 times per week   Current Exercises Include Yard Work;Gardening   Do you need help using the phone?  No   Are you deaf or do you have serious difficulty hearing?  No   Do you need help to go to places out of walking distance? No   Do you need help shopping? No   Do you need help preparing meals?  No   Do you need help with housework?  No   Do you need help with laundry? No   Do you need help taking your medications? No   Do you need help managing money? No   Do you ever drive or ride in a car without wearing a seat belt? Yes   Have you felt unusual stress, anger or loneliness in the last month? No   Who do you live with? Spouse   If you need help, do you have trouble finding someone available to you? No   Have you been bothered in the last four weeks by sexual problems? No   Do you have difficulty concentrating, remembering or making decisions? No       Age-appropriate Screening Schedule:  Refer to the list below for future screening recommendations based on patient's age, sex and/or medical  conditions. Orders for these recommended tests are listed in the plan section. The patient has been provided with a written plan.    Health Maintenance   Topic Date Due    DXA SCAN  07/27/2024    ZOSTER VACCINE (1 of 2) 05/21/2024 (Originally 2/22/2005)    COVID-19 Vaccine (3 - 2023-24 season) 08/10/2024 (Originally 9/1/2023)    RSV Vaccine - Adults (1 - 1-dose 60+ series) 05/21/2025 (Originally 2/22/2015)    INFLUENZA VACCINE  08/01/2024    LIPID PANEL  11/14/2024    ANNUAL WELLNESS VISIT  05/21/2025    MAMMOGRAM  11/14/2025    COLORECTAL CANCER SCREENING  12/09/2027    TDAP/TD VACCINES (2 - Td or Tdap) 09/17/2028    HEPATITIS C SCREENING  Completed    Pneumococcal Vaccine 65+  Completed                  CMS Preventative Services Quick Reference  Risk Factors Identified During Encounter:    None Identified    The above risks/problems have been discussed with the patient.  Pertinent information has been shared with the patient in the After Visit Summary.    Diagnoses and all orders for this visit:    1. Medicare annual wellness visit, subsequent (Primary)  Assessment & Plan:  AWV completed 5/24.  Patient remains very active and independent.  She has not had any falls nor hospitalizations in the past year.  She does not have a living well per se, but she wishes remain a full code.    Orders:  -     Hemoglobin A1c; Future  -     TSH; Future  -     CBC & Differential; Future  -     Vitamin B12 anemia; Future  -     Folate anemia; Future  -     TSH+Free T4; Future    2. Mixed hyperlipidemia  Assessment & Plan:  LDL was up from 86 to 130 in 11/23 and in looking back last script was a years worth written in 10/22.  Patient's blood pressure is fine, she is a non-smoker, her fasting glucose is in the 80s.    She does not have a family history of early CAD.    I does recommend patient hold off on the medication altogether rather than use it intermittently, and we will see where her LDL lands at her next  appointment.    Orders:  -     Comprehensive Metabolic Panel; Future  -     Lipid Panel; Future    3. Migraine with aura and without status migrainosus, not intractable  Assessment & Plan:  Appears patient is very stable this regards as of 5/24.  She has Imitrex available if needed, but has been able to abort the major headaches with Tylenol here lately.  Blood pressure is a nonissue as well.          4. Primary osteoarthritis involving multiple joints  Assessment & Plan:  Patient with scattered arthritic type pains, nothing persistent, she does not require any chronic medications for that as of her 11/23 office visit---> ditto as of 5/24.          Follow Up:   Next Medicare Wellness visit to be scheduled in 1 year.      An After Visit Summary and PPPS were made available to the patient.

## 2024-05-21 NOTE — ASSESSMENT & PLAN NOTE
LDL was up from 86 to 130 in 11/23 and in looking back last script was a years worth written in 10/22.  Patient's blood pressure is fine, she is a non-smoker, her fasting glucose is in the 80s.    She does not have a family history of early CAD.    I does recommend patient hold off on the medication altogether rather than use it intermittently, and we will see where her LDL lands at her next appointment.

## 2024-05-21 NOTE — ASSESSMENT & PLAN NOTE
Patient with scattered arthritic type pains, nothing persistent, she does not require any chronic medications for that as of her 11/23 office visit---> osvaldo as of 5/24.

## 2024-05-21 NOTE — PROGRESS NOTES
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  We will go over her med list, review any labs obtained in detail together, and address any new concerns as time permits.    Review of Systems   Constitutional:  Negative for appetite change, fatigue and fever.   HENT:  Negative for congestion and ear pain.    Eyes:  Negative for blurred vision.   Respiratory:  Negative for cough, chest tightness, shortness of breath and wheezing.    Cardiovascular:  Negative for chest pain, palpitations and leg swelling.   Gastrointestinal:  Negative for abdominal pain.   Genitourinary:  Negative for difficulty urinating, dysuria and hematuria.   Musculoskeletal:  Negative for arthralgias and gait problem.   Skin:  Negative for skin lesions.   Neurological:  Negative for syncope, memory problem and confusion.   Psychiatric/Behavioral:  Negative for self-injury and depressed mood.        Objective   Vital Signs:   /80 (BP Location: Left arm, Patient Position: Sitting)   Pulse 78   Temp 97 °F (36.1 °C) (Temporal)   Ht 152.4 cm (60\")   Wt 56.7 kg (125 lb)   SpO2 93%   BMI 24.41 kg/m²           Physical Exam  Vitals and nursing note reviewed.   Constitutional:       General: She is not in acute distress.     Appearance: Normal appearance. She is not toxic-appearing.   HENT:      Head: Atraumatic.      Right Ear: External ear normal.      Left Ear: External ear normal.      Nose: Nose normal.      Mouth/Throat:      Mouth: Mucous membranes are moist.   Eyes:      General:         Right eye: No discharge.         Left eye: No discharge.      Extraocular Movements: Extraocular movements intact.      Pupils: Pupils are equal, round, and " reactive to light.   Cardiovascular:      Rate and Rhythm: Normal rate and regular rhythm.      Pulses: Normal pulses.      Heart sounds: Normal heart sounds. No murmur heard.     No gallop.   Pulmonary:      Effort: Pulmonary effort is normal. No respiratory distress.      Breath sounds: No wheezing, rhonchi or rales.   Abdominal:      General: There is no distension.      Palpations: Abdomen is soft. There is no mass.      Tenderness: There is no abdominal tenderness. There is no guarding.   Musculoskeletal:         General: No swelling or tenderness.      Cervical back: No tenderness.      Right lower leg: No edema.      Left lower leg: No edema.   Skin:     General: Skin is warm and dry.      Findings: No rash.   Neurological:      General: No focal deficit present.      Mental Status: She is alert and oriented to person, place, and time. Mental status is at baseline.      Motor: No weakness.      Gait: Gait normal.   Psychiatric:         Mood and Affect: Mood normal.         Thought Content: Thought content normal.          Result Review   The following data was reviewed by: Juno Jerome MD on 04/27/2022:  [x] Laboratory  [] Microbiology  [] Radiology  [] EKG/telemetry  [] Cardiology/Vascular  [] Pathology  [x] Old records             Assessment and Plan   Diagnoses and all orders for this visit:    1. Medicare annual wellness visit, subsequent (Primary)  Assessment & Plan:  AWV completed 5/24.  Patient remains very active and independent.  She has not had any falls nor hospitalizations in the past year.  She does not have a living well per se, but she wishes remain a full code.    Orders:  -     Hemoglobin A1c; Future  -     TSH; Future  -     CBC & Differential; Future  -     Vitamin B12 anemia; Future  -     Folate anemia; Future  -     TSH+Free T4; Future    2. Mixed hyperlipidemia  Assessment & Plan:  LDL was up from 86 to 130 in 11/23 and in looking back last script was a years worth written in 10/22.   Patient's blood pressure is fine, she is a non-smoker, her fasting glucose is in the 80s.    She does not have a family history of early CAD.    I does recommend patient hold off on the medication altogether rather than use it intermittently, and we will see where her LDL lands at her next appointment.    Orders:  -     Comprehensive Metabolic Panel; Future  -     Lipid Panel; Future    3. Migraine with aura and without status migrainosus, not intractable  Assessment & Plan:  Appears patient is very stable this regards as of 5/24.  She has Imitrex available if needed, but has been able to abort the major headaches with Tylenol here lately.  Blood pressure is a nonissue as well.          4. Primary osteoarthritis involving multiple joints  Assessment & Plan:  Patient with scattered arthritic type pains, nothing persistent, she does not require any chronic medications for that as of her 11/23 office visit---> ditto as of 5/24.            --  --  OLDER NOTES:  VISIT 6/21:  ANNUAL PHYSICAL 7/20 and d/w all labs in detail; no ischemic sx's at work/home.  --  HYPOTHYROIDISM is new as of 2/18 OV=no sxs, so will repeat...wnl as of 8/18 OV...fine 7/19 w/o meds...ditto 7/20---> wnl 6/21.  --  ANXIETY D/O = f/u on SSRI past 6 weeks or so, took off a week, daughter who is on disability moved out into [facility], no car/drivers license=they got custody of 9 yo grand-daughter, apparently this daughter has schizophrenia just like her son; so, stress is understandable, but does feel better on Celexa=calmer...pleasant---> no new concerns 6/21  WT LOSS of 4 lbs after down a few prior to above; no N/V/blood in stool; asked her to monitor at home...down 10 total to 114 and I d/w eat more please...stable at least--->120's holding 6/21.  --  LEUKOPENIA 1/17 is new and will repeat 6 mo...4.8 is better/fine, and has been lower prior actually; all lines wnl...4.1 with nl diff...4.5 is same 7/20, but need to keep eye on Lymphs---> wnl  "6/21.  ANEMIA = 10.9 as of 7/19 OV, so will get more labs on RTO...better 7/20--->same 6/21 and not interested in c-scope.  --  LIPIDS.../HDL 90 so d/w tx on RTO if the same...152 and d/w stop the strict diet as above; ? tx if back up...130 fine...131 is holding/ HDL 70's...153 again and I rec tx as of 7/19 OV...92 is nice drop=repeat on RTO fall '20...133 and reports c/w, so needs 20 mg now---> 149 in 6/21 and says it's too expensive; so I printed out $12 coupon for 90 days.  --  MIGRANE H/A...stable...3 pills/month...less than that recently...about 4-5x/month is current/typical average...less at 11/19 OV, but she doesn't want any preventive meds (Had MRI per ENT 9/19 for hearing loss).  \"SPELLS\" per HPI, exam is benign, 1 spell at work, 1 when driving, ? panic attack and d/w call if recurrent...still no c/o presently.  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  --  VIT D DEF stable=33...25 and I d/w her 2/18---> 39 in 7/20 = RTO.  OSTEOPENIA...BMD 11/16...spine -1.3, hip -1.6...9/19 = spine -1.3, hip -1.7---> needed fall '21.  --  L KNEE PAIN 7/20 with neg xray per urgent care and I d/w eval per Ortho now since pain when she is on it; wear ace sleeve.  --   MMG 7/27/2022 per me.  COLON 12/22...polyp/FH+ = 5 yrs per Dr Delgado.  Adacel 8/18;   ( #2 at age 39, 1 girl RN in [place], other girl bipolar/schizo, and 1 son with paranoid schizo...they live here...raising 15 yo grand-daughter still as of 5/24 OV; Mom passed 60's with Lupus, F passed 97 in 9/23 and she cared for him with Hospice, he was farmer/preacher; S/B = no changes 11/23--->Patient retired in 2023).    Follow Up   Return in about 6 months (around 11/21/2024).  Patient was given instructions and counseling " regarding her condition or for health maintenance advice. Please see specific information pulled into the AVS if appropriate.

## 2024-05-21 NOTE — ASSESSMENT & PLAN NOTE
CHUYITA completed 5/24.  Patient remains very active and independent.  She has not had any falls nor hospitalizations in the past year.  She does not have a living well per se, but she wishes remain a full code.

## 2024-11-21 ENCOUNTER — OFFICE VISIT (OUTPATIENT)
Dept: INTERNAL MEDICINE | Age: 69
End: 2024-11-21
Payer: MEDICARE

## 2024-11-21 VITALS
TEMPERATURE: 97.8 F | HEIGHT: 60 IN | HEART RATE: 70 BPM | DIASTOLIC BLOOD PRESSURE: 84 MMHG | OXYGEN SATURATION: 99 % | WEIGHT: 121.2 LBS | SYSTOLIC BLOOD PRESSURE: 140 MMHG | BODY MASS INDEX: 23.8 KG/M2

## 2024-11-21 DIAGNOSIS — D12.2 ADENOMATOUS POLYP OF ASCENDING COLON: ICD-10-CM

## 2024-11-21 DIAGNOSIS — Z00.00 WELL ADULT EXAM: ICD-10-CM

## 2024-11-21 DIAGNOSIS — M85.89 OSTEOPENIA OF MULTIPLE SITES: ICD-10-CM

## 2024-11-21 DIAGNOSIS — E78.2 MIXED HYPERLIPIDEMIA: Primary | ICD-10-CM

## 2024-11-21 DIAGNOSIS — E55.9 VITAMIN D DEFICIENCY: ICD-10-CM

## 2024-11-21 DIAGNOSIS — Z12.31 BREAST CANCER SCREENING BY MAMMOGRAM: ICD-10-CM

## 2024-11-21 PROBLEM — M15.0 PRIMARY OSTEOARTHRITIS INVOLVING MULTIPLE JOINTS: Status: RESOLVED | Noted: 2021-12-20 | Resolved: 2024-11-21

## 2024-11-21 NOTE — ASSESSMENT & PLAN NOTE
Patient on no supplementation, vitamin D was fine checked it last, will repeated on return to office in the spring.

## 2024-11-21 NOTE — ASSESSMENT & PLAN NOTE
LDL had trended up from around 85 to 130 as per her 11/23 labs.  Patient had made decision to discontinue low-dose statin therapy.  She does not have significant family history of CAD.    We will repeat this on return to office in the spring, if much higher, will discuss with patient possibly obtaining a CT calcium score.

## 2024-11-21 NOTE — PROGRESS NOTES
"Chief Complaint  Follow-up (Pt has not had labs. No new issues.) and Hyperlipidemia    Subjective      Giselle Tristan presents to [facility]    Hyperlipidemia  Pertinent negatives include no chest pain or shortness of breath.     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  We will go over her med list, review any labs obtained in detail together, and address any new concerns as time permits.    Review of Systems   Constitutional:  Negative for appetite change, fatigue and fever.   HENT:  Negative for congestion and ear pain.    Eyes:  Negative for blurred vision.   Respiratory:  Negative for cough, chest tightness, shortness of breath and wheezing.    Cardiovascular:  Negative for chest pain, palpitations and leg swelling.   Gastrointestinal:  Negative for abdominal pain.   Genitourinary:  Negative for difficulty urinating, dysuria and hematuria.   Musculoskeletal:  Negative for arthralgias and gait problem.   Skin:  Negative for skin lesions.   Neurological:  Negative for syncope, memory problem and confusion.   Psychiatric/Behavioral:  Negative for self-injury and depressed mood.        Objective   Vital Signs:   /84 (BP Location: Right arm, Patient Position: Sitting, Cuff Size: Adult)   Pulse 70   Temp 97.8 °F (36.6 °C) (Skin)   Ht 152.4 cm (60\")   Wt 55 kg (121 lb 3.2 oz)   SpO2 99%   BMI 23.67 kg/m²           Physical Exam  Vitals and nursing note reviewed.   Constitutional:       General: She is not in acute distress.     Appearance: Normal appearance. She is not toxic-appearing.   HENT:      Head: Atraumatic.      Right Ear: External ear normal.      Left Ear: External ear normal.      Nose: Nose normal.      Mouth/Throat:      Mouth: Mucous membranes are moist.   Eyes:      General:         Right eye: No " discharge.         Left eye: No discharge.      Extraocular Movements: Extraocular movements intact.      Pupils: Pupils are equal, round, and reactive to light.   Neck:      Comments: No carotid bruits  Cardiovascular:      Rate and Rhythm: Normal rate and regular rhythm.      Pulses: Normal pulses.      Heart sounds: Normal heart sounds. No murmur heard.     No gallop.      Comments: Heart tones normal, no ectopy, no S3.  Pulmonary:      Effort: Pulmonary effort is normal. No respiratory distress.      Breath sounds: No wheezing, rhonchi or rales.      Comments: Lung fields remain clear bilaterally.  Abdominal:      General: There is no distension.      Palpations: Abdomen is soft. There is no mass.      Tenderness: There is no abdominal tenderness. There is no guarding.   Musculoskeletal:         General: No swelling or tenderness.      Cervical back: No tenderness.      Right lower leg: No edema.      Left lower leg: No edema.      Comments: No peripheral edema.   Skin:     General: Skin is warm and dry.      Findings: No rash.   Neurological:      General: No focal deficit present.      Mental Status: She is alert and oriented to person, place, and time. Mental status is at baseline.      Motor: No weakness.      Gait: Gait normal.   Psychiatric:         Mood and Affect: Mood normal.         Thought Content: Thought content normal.          Result Review   The following data was reviewed by: Juno Jerome MD on 04/27/2022:  [x] Laboratory  [] Microbiology  [] Radiology  [] EKG/telemetry  [] Cardiology/Vascular  [] Pathology  [x] Old records             Assessment and Plan   Diagnoses and all orders for this visit:    1. Mixed hyperlipidemia (Primary)  Assessment & Plan:  LDL had trended up from around 85 to 130 as per her 11/23 labs.  Patient had made decision to discontinue low-dose statin therapy.  She does not have significant family history of CAD.    We will repeat this on return to office in the spring,  if much higher, will discuss with patient possibly obtaining a CT calcium score.    Orders:  -     Comprehensive Metabolic Panel; Future  -     Lipid Panel; Future    2. Osteopenia of multiple sites  Overview:  BMD 7/22:  Spine -1.3  Hip -1.7    BMD 9/19:  Spine -1.3  Hip -1.7          Assessment & Plan:  Order placed at her 11/24 OV.    Orders:  -     DEXA Bone Density Axial; Future    3. Breast cancer screening by mammogram  -     Mammo Screening Digital Tomosynthesis Bilateral With CAD; Future    4. Well adult exam  -     CBC & Differential; Future  -     TSH+Free T4; Future  -     Urinalysis With Culture If Indicated -; Future  -     Hemoglobin A1c; Future  -     Vitamin B12 anemia; Future  -     Folate anemia; Future    5. Vitamin D deficiency  Assessment & Plan:  Patient on no supplementation, vitamin D was fine checked it last, will repeated on return to office in the spring.    Orders:  -     Vitamin D,25-Hydroxy; Future    6. Adenomatous polyp of ascending colon  Overview:  C-SCOPE 12/22:  polyp/FH+ = 5 yrs per Dr Delgado.    Assessment & Plan:  Patient obviously asymptomatic this regards as of 11/24, no change in bowels no blood in stool.              --  --  OLDER NOTES:  VISIT 6/21:  ANNUAL PHYSICAL 7/20 and d/w all labs in detail; no ischemic sx's at work/home.  --  HYPOTHYROIDISM is new as of 2/18 OV=no sxs, so will repeat...wnl as of 8/18 OV...fine 7/19 w/o meds...ditto 7/20---> wnl 6/21.  --  ANXIETY D/O = f/u on SSRI past 6 weeks or so, took off a week, daughter who is on disability moved out into [place], no car/drivers license=they got custody of 9 yo grand-daughter, apparently this daughter has schizophrenia just like her son; so, stress is understandable, but does feel better on Celexa=calmer...pleasant---> no new concerns 6/21  WT LOSS of 4 lbs after down a few prior to above; no N/V/blood in stool; asked her to monitor at home...down 10 total to 114 and I d/w eat more please...stable at  "least--->120's holding 6/21.  --  LEUKOPENIA 1/17 is new and will repeat 6 mo...4.8 is better/fine, and has been lower prior actually; all lines wnl...4.1 with nl diff...4.5 is same 7/20, but need to keep eye on Lymphs---> wnl 6/21.  ANEMIA = 10.9 as of 7/19 OV, so will get more labs on RTO...better 7/20--->same 6/21 and not interested in c-scope.  --  LIPIDS.../HDL 90 so d/w tx on RTO if the same...152 and d/w stop the strict diet as above; ? tx if back up...130 fine...131 is holding/ HDL 70's...153 again and I rec tx as of 7/19 OV...92 is nice drop=repeat on RTO fall '20...133 and reports c/w, so needs 20 mg now---> 149 in 6/21 and says it's too expensive; so I printed out $12 coupon for 90 days.  --  MIGRANE H/A...stable...3 pills/month...less than that recently...about 4-5x/month is current/typical average...less at 11/19 OV, but she doesn't want any preventive meds (Had MRI per ENT 9/19 for hearing loss).  \"SPELLS\" per HPI, exam is benign, 1 spell at work, 1 when driving, ? panic attack and d/w call if recurrent...still no c/o presently.  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  --  VIT D DEF stable=33...25 and I d/w her 2/18---> 39 in 7/20 = RTO.  OSTEOPENIA...BMD 11/16...spine -1.3, hip -1.6...9/19 = spine -1.3, hip -1.7---> needed fall '21.  --  L KNEE PAIN 7/20 with neg xray per urgent care and I d/w eval per Ortho now since pain when she is on it; wear ace sleeve.  --   MMG 7/27/2022 per me.  COLON 12/22...polyp/FH+ = 5 yrs per Dr Delgado.  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    Follow Up   Return in about 6 months (around 6/2/2025).  Patient was given instructions and counseling regarding her condition or for health maintenance advice. Please see specific information pulled into the AVS if appropriate.

## 2025-01-15 ENCOUNTER — HOSPITAL ENCOUNTER (OUTPATIENT)
Dept: MAMMOGRAPHY | Facility: HOSPITAL | Age: 70
Discharge: HOME | End: 2025-01-15
Payer: MEDICARE

## 2025-01-15 ENCOUNTER — HOSPITAL ENCOUNTER (OUTPATIENT)
Dept: BONE DENSITY | Facility: HOSPITAL | Age: 70
Discharge: HOME | End: 2025-01-15
Payer: MEDICARE

## 2025-01-15 ENCOUNTER — LAB (OUTPATIENT)
Dept: INTERNAL MEDICINE | Age: 70
End: 2025-01-15
Payer: MEDICARE

## 2025-01-15 DIAGNOSIS — Z00.00 MEDICARE ANNUAL WELLNESS VISIT, SUBSEQUENT: ICD-10-CM

## 2025-01-15 DIAGNOSIS — Z12.31 BREAST CANCER SCREENING BY MAMMOGRAM: ICD-10-CM

## 2025-01-15 DIAGNOSIS — M85.89 OSTEOPENIA OF MULTIPLE SITES: ICD-10-CM

## 2025-01-15 DIAGNOSIS — E78.2 MIXED HYPERLIPIDEMIA: ICD-10-CM

## 2025-01-15 LAB
ALBUMIN SERPL-MCNC: 4.5 G/DL (ref 3.5–5.2)
ALBUMIN/GLOB SERPL: 1.8 G/DL
ALP SERPL-CCNC: 76 U/L (ref 39–117)
ALT SERPL W P-5'-P-CCNC: 16 U/L (ref 1–33)
ANION GAP SERPL CALCULATED.3IONS-SCNC: 11.6 MMOL/L (ref 5–15)
AST SERPL-CCNC: 22 U/L (ref 1–32)
BASOPHILS # BLD AUTO: 0.06 10*3/MM3 (ref 0–0.2)
BASOPHILS NFR BLD AUTO: 1.3 % (ref 0–1.5)
BILIRUB SERPL-MCNC: 0.4 MG/DL (ref 0–1.2)
BUN SERPL-MCNC: 15 MG/DL (ref 8–23)
BUN/CREAT SERPL: 16.7 (ref 7–25)
CALCIUM SPEC-SCNC: 10 MG/DL (ref 8.6–10.5)
CHLORIDE SERPL-SCNC: 104 MMOL/L (ref 98–107)
CHOLEST SERPL-MCNC: 299 MG/DL (ref 0–200)
CO2 SERPL-SCNC: 24.4 MMOL/L (ref 22–29)
CREAT SERPL-MCNC: 0.9 MG/DL (ref 0.57–1)
DEPRECATED RDW RBC AUTO: 40.4 FL (ref 37–54)
EGFRCR SERPLBLD CKD-EPI 2021: 69.3 ML/MIN/1.73
EOSINOPHIL # BLD AUTO: 0.11 10*3/MM3 (ref 0–0.4)
EOSINOPHIL NFR BLD AUTO: 2.3 % (ref 0.3–6.2)
ERYTHROCYTE [DISTWIDTH] IN BLOOD BY AUTOMATED COUNT: 12.6 % (ref 12.3–15.4)
FOLATE SERPL-MCNC: 7.96 NG/ML (ref 4.78–24.2)
GLOBULIN UR ELPH-MCNC: 2.5 GM/DL
GLUCOSE SERPL-MCNC: 91 MG/DL (ref 65–99)
HBA1C MFR BLD: 5.7 % (ref 4.8–5.6)
HCT VFR BLD AUTO: 36.9 % (ref 34–46.6)
HDLC SERPL-MCNC: 76 MG/DL (ref 40–60)
HGB BLD-MCNC: 12.3 G/DL (ref 12–15.9)
IMM GRANULOCYTES # BLD AUTO: 0.01 10*3/MM3 (ref 0–0.05)
IMM GRANULOCYTES NFR BLD AUTO: 0.2 % (ref 0–0.5)
LDLC SERPL CALC-MCNC: 207 MG/DL (ref 0–100)
LDLC/HDLC SERPL: 2.68 {RATIO}
LYMPHOCYTES # BLD AUTO: 1.75 10*3/MM3 (ref 0.7–3.1)
LYMPHOCYTES NFR BLD AUTO: 37.3 % (ref 19.6–45.3)
MCH RBC QN AUTO: 29.4 PG (ref 26.6–33)
MCHC RBC AUTO-ENTMCNC: 33.3 G/DL (ref 31.5–35.7)
MCV RBC AUTO: 88.1 FL (ref 79–97)
MONOCYTES # BLD AUTO: 0.3 10*3/MM3 (ref 0.1–0.9)
MONOCYTES NFR BLD AUTO: 6.4 % (ref 5–12)
NEUTROPHILS NFR BLD AUTO: 2.46 10*3/MM3 (ref 1.7–7)
NEUTROPHILS NFR BLD AUTO: 52.5 % (ref 42.7–76)
NRBC BLD AUTO-RTO: 0 /100 WBC (ref 0–0.2)
PLATELET # BLD AUTO: 274 10*3/MM3 (ref 140–450)
PMV BLD AUTO: 10.7 FL (ref 6–12)
POTASSIUM SERPL-SCNC: 4.7 MMOL/L (ref 3.5–5.2)
PROT SERPL-MCNC: 7 G/DL (ref 6–8.5)
RBC # BLD AUTO: 4.19 10*6/MM3 (ref 3.77–5.28)
SODIUM SERPL-SCNC: 140 MMOL/L (ref 136–145)
T4 FREE SERPL-MCNC: 1.18 NG/DL (ref 0.92–1.68)
TRIGL SERPL-MCNC: 95 MG/DL (ref 0–150)
TSH SERPL DL<=0.05 MIU/L-ACNC: 4.5 UIU/ML (ref 0.27–4.2)
VIT B12 BLD-MCNC: 458 PG/ML (ref 211–946)
VLDLC SERPL-MCNC: 16 MG/DL (ref 5–40)
WBC NRBC COR # BLD AUTO: 4.69 10*3/MM3 (ref 3.4–10.8)

## 2025-01-15 PROCEDURE — 85025 COMPLETE CBC W/AUTO DIFF WBC: CPT | Performed by: INTERNAL MEDICINE

## 2025-01-15 PROCEDURE — 77063 BREAST TOMOSYNTHESIS BI: CPT

## 2025-01-15 PROCEDURE — 82607 VITAMIN B-12: CPT | Performed by: INTERNAL MEDICINE

## 2025-01-15 PROCEDURE — 77080 DXA BONE DENSITY AXIAL: CPT

## 2025-01-15 PROCEDURE — 83036 HEMOGLOBIN GLYCOSYLATED A1C: CPT | Performed by: INTERNAL MEDICINE

## 2025-01-15 PROCEDURE — 80053 COMPREHEN METABOLIC PANEL: CPT | Performed by: INTERNAL MEDICINE

## 2025-01-15 PROCEDURE — 80061 LIPID PANEL: CPT | Performed by: INTERNAL MEDICINE

## 2025-01-15 PROCEDURE — 36415 COLL VENOUS BLD VENIPUNCTURE: CPT | Performed by: INTERNAL MEDICINE

## 2025-01-15 PROCEDURE — 82746 ASSAY OF FOLIC ACID SERUM: CPT | Performed by: INTERNAL MEDICINE

## 2025-01-15 PROCEDURE — 77067 SCR MAMMO BI INCL CAD: CPT

## 2025-01-15 PROCEDURE — 84439 ASSAY OF FREE THYROXINE: CPT | Performed by: INTERNAL MEDICINE

## 2025-01-15 PROCEDURE — 84443 ASSAY THYROID STIM HORMONE: CPT | Performed by: INTERNAL MEDICINE

## 2025-05-21 ENCOUNTER — OFFICE VISIT (OUTPATIENT)
Age: 70
End: 2025-05-21
Payer: MEDICARE

## 2025-05-21 VITALS
WEIGHT: 124 LBS | DIASTOLIC BLOOD PRESSURE: 80 MMHG | HEIGHT: 60 IN | OXYGEN SATURATION: 99 % | BODY MASS INDEX: 24.35 KG/M2 | HEART RATE: 80 BPM | SYSTOLIC BLOOD PRESSURE: 140 MMHG

## 2025-05-21 DIAGNOSIS — Z00.00 MEDICARE ANNUAL WELLNESS VISIT, SUBSEQUENT: Primary | ICD-10-CM

## 2025-05-21 DIAGNOSIS — E55.9 VITAMIN D DEFICIENCY: ICD-10-CM

## 2025-05-21 DIAGNOSIS — R73.01 IMPAIRED FASTING GLUCOSE: ICD-10-CM

## 2025-05-21 DIAGNOSIS — E78.2 MIXED HYPERLIPIDEMIA: ICD-10-CM

## 2025-05-21 DIAGNOSIS — E03.8 SUBCLINICAL HYPOTHYROIDISM: ICD-10-CM

## 2025-05-21 RX ORDER — CHOLECALCIFEROL (VITAMIN D3) 25 MCG
2000 TABLET,CHEWABLE ORAL DAILY
Qty: 180 TABLET | Refills: 1 | Status: SHIPPED | OUTPATIENT
Start: 2025-05-21

## 2025-05-21 RX ORDER — ATORVASTATIN CALCIUM 10 MG/1
10 TABLET, FILM COATED ORAL DAILY
Qty: 90 TABLET | Refills: 3 | Status: SHIPPED | OUTPATIENT
Start: 2025-05-21

## 2025-05-21 RX ORDER — CALCIUM CARBONATE 500 MG/1
1 TABLET, CHEWABLE ORAL 2 TIMES DAILY
Qty: 180 TABLET | Refills: 1 | Status: SHIPPED | OUTPATIENT
Start: 2025-05-21

## 2025-05-21 NOTE — ASSESSMENT & PLAN NOTE
Patient's TSH was 4.5 in 1/25, she is asymptomatic this regards as of her 5/25 OV, but we obviously need to repeat the level since it was only 2.2 prior to that.

## 2025-05-21 NOTE — PATIENT INSTRUCTIONS
1.  You have nonfasting blood test to do here within a week on your thyroid and sugar, we will call if there is an abnormality.    2.  You have routine fasting blood test to do just a day or so before your 6-month follow-up.    3.  We sent prescriptions over for the atorvastatin low-dose, Tums, and vitamin D3 Gummies.

## 2025-05-21 NOTE — PROGRESS NOTES
Subjective   The ABCs of the Annual Wellness Visit  Medicare Wellness Visit      Giselle Tristan is a 70 y.o. patient who presents for a Medicare Wellness Visit.    The following portions of the patient's history were reviewed and   updated as appropriate: allergies, current medications, past family history, past medical history, past social history, past surgical history, and problem list.    Compared to one year ago, the patient's physical   health is the same.  Compared to one year ago, the patient's mental   health is the same.    Recent Hospitalizations:  She was not admitted to the hospital during the last year.     Current Medical Providers:  Patient Care Team:  Juno Jerome MD as PCP - General (Internal Medicine)  Steven Delgado MD as Consulting Physician (Gastroenterology)    Outpatient Medications Prior to Visit   Medication Sig Dispense Refill    calcium carbonate-cholecalciferol 500-400 MG-UNIT tablet tablet Take 2 tablets by mouth 2 (Two) Times a Day With Meals. 360 tablet 1     No facility-administered medications prior to visit.     No opioid medication identified on active medication list. I have reviewed chart for other potential  high risk medication/s and harmful drug interactions in the elderly.      Aspirin is not on active medication list.  Aspirin use is not indicated based on review of current medical condition/s. Risk of harm outweighs potential benefits.  .    Patient Active Problem List   Diagnosis    Mixed hyperlipidemia    Vitamin D deficiency    Recurrent major depression in partial remission    Leukopenia    Osteopenia of multiple sites    Iron deficiency anemia    Adenomatous polyp of ascending colon    Migraine with aura and without status migrainosus, not intractable    Medicare annual wellness visit, subsequent     Advance Care Planning Advance Directive is not on file.  ACP discussion was held with the patient during this visit. Patient does not have an advance  "directive, information provided.            Objective   Vitals:    25 1049   BP: 140/80   Pulse: 80   SpO2: 99%   Weight: 56.2 kg (124 lb)   Height: 152.4 cm (60\")   PainSc: 0-No pain       Estimated body mass index is 24.22 kg/m² as calculated from the following:    Height as of this encounter: 152.4 cm (60\").    Weight as of this encounter: 56.2 kg (124 lb).    BMI is within normal parameters. No other follow-up for BMI required.           Does the patient have evidence of cognitive impairment? No                                                                                               Health  Risk Assessment    Smoking Status:  Social History     Tobacco Use   Smoking Status Never   Smokeless Tobacco Never     Alcohol Consumption:  Social History     Substance and Sexual Activity   Alcohol Use Never       Fall Risk Screen  STEADI Fall Risk Assessment was completed, and patient is at LOW risk for falls.Assessment completed on:2025    Depression Screening   Little interest or pleasure in doing things? Not at all   Feeling down, depressed, or hopeless? Not at all   PHQ-2 Total Score 0      Health Habits and Functional and Cognitive Screenin/21/2025    10:47 AM   Functional & Cognitive Status   Do you have difficulty preparing food and eating? No   Do you have difficulty bathing yourself, getting dressed or grooming yourself? No   Do you have difficulty using the toilet? No   Do you have difficulty moving around from place to place? No   Do you have trouble with steps or getting out of a bed or a chair? No   Current Diet Well Balanced Diet   Dental Exam Up to date   Eye Exam Up to date   Exercise (times per week) 0 times per week   Current Exercises Include Other;Walking   Do you need help using the phone?  No   Are you deaf or do you have serious difficulty hearing?  No   Do you need help to go to places out of walking distance? No   Do you need help shopping? No   Do you need help preparing " meals?  No   Do you need help with housework?  No   Do you need help with laundry? No   Do you need help taking your medications? No   Do you need help managing money? No   Do you ever drive or ride in a car without wearing a seat belt? No   Have you felt unusual stress, anger or loneliness in the last month? No   Who do you live with? Spouse   If you need help, do you have trouble finding someone available to you? No   Have you been bothered in the last four weeks by sexual problems? No   Do you have difficulty concentrating, remembering or making decisions? No           Age-appropriate Screening Schedule:  Refer to the list below for future screening recommendations based on patient's age, sex and/or medical conditions. Orders for these recommended tests are listed in the plan section. The patient has been provided with a written plan.    Health Maintenance List  Health Maintenance   Topic Date Due    ZOSTER VACCINE (1 of 2) Never done    ANNUAL WELLNESS VISIT  05/21/2025    INFLUENZA VACCINE  07/01/2025    LIPID PANEL  01/15/2026    MAMMOGRAM  01/15/2027    DXA SCAN  01/15/2027    COLORECTAL CANCER SCREENING  12/09/2027    TDAP/TD VACCINES (2 - Td or Tdap) 09/17/2028    HEPATITIS C SCREENING  Completed    Pneumococcal Vaccine 50+  Completed    COVID-19 Vaccine  Discontinued                                                                                                                                                CMS Preventative Services Quick Reference  Risk Factors Identified During Encounter  None Identified    The above risks/problems have been discussed with the patient.  Pertinent information has been shared with the patient in the After Visit Summary.  An After Visit Summary and PPPS were made available to the patient.    Follow Up:   Next Medicare Wellness visit to be scheduled in 1 year.     Assessment & Plan  Medicare annual wellness visit, subsequent  AWV completed 5/25.  Patient remains very active  and independent.  She has not had any falls nor hospitalizations in the past year.  She does not have a living well per se, but she wishes remain a full code.         Mixed hyperlipidemia  Patient's LDL went up from 130 to over 200 in 1/25 after she came off of the low-dose statin.  She does not have significant family history of CAD, but discussed with her given that level, we should get a CT calcium score to ensure that she does not have any significant blockage already.  ---> We discussed this at her 5/25 OV, she actually feels more comfortable getting back on low-dose atorvastatin, and that is certainly reasonable, we will follow-up labs in about 6 months.         Vitamin D deficiency  Will get level here shortly and make further recommendations.              Follow Up:   No follow-ups on file.

## 2025-05-21 NOTE — PROGRESS NOTES
Chief Complaint  Hyperlipidemia, Follow-up (Pt had labs in January, you wanted to see her then, but she refused. (  Pt did not want to be seen until her 6 month appt. I explained to her you wanted her to be seen sooner and she refused.) she states that this routine, she states that she is getting billed for a wellness and she had to pay 120.00 for her last visit. The only thing I found was her DX on her last office visit had a well adult dx on it. ), and Medicare Wellness-subsequent    Subjective      Giselle Tristan presents to [facility]    Hyperlipidemia  Pertinent negatives include no chest pain, myalgias or shortness of breath.     F/U lab,Mamogram,bone density  Pertinent negative symptoms include no abdominal pain, no anorexia, no joint pain, no change in stool, no chest pain, no chills, no congestion, no cough, no diaphoresis, no fatigue, no fever, no headaches, no joint swelling, no myalgias, no nausea, no neck pain, no numbness, no rash, no sore throat, no swollen glands, no dysuria, no vertigo, no visual change, no vomiting and no weakness.   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  We will go over her med list, review any labs obtained in detail together, and address any new concerns as time permits.    Review of Systems   Constitutional:  Negative for appetite change, chills, diaphoresis, fatigue and fever.   HENT:  Negative for congestion, ear pain, sore throat and swollen glands.    Eyes:  Negative for blurred vision.   Respiratory:  Negative for cough, chest tightness, shortness of breath and wheezing.    Cardiovascular:  Negative for chest pain, palpitations and leg swelling.   Gastrointestinal:  Negative for abdominal pain, anorexia, nausea and  "vomiting.   Genitourinary:  Negative for difficulty urinating, dysuria and hematuria.   Musculoskeletal:  Negative for arthralgias, gait problem, joint pain, myalgias and neck pain.   Skin:  Negative for rash and skin lesions.   Neurological:  Negative for vertigo, syncope, weakness, numbness, memory problem and confusion.   Psychiatric/Behavioral:  Negative for self-injury and depressed mood.        Objective   Vital Signs:   /80   Pulse 80   Ht 152.4 cm (60\")   Wt 56.2 kg (124 lb)   SpO2 99%   BMI 24.22 kg/m²           Physical Exam  Vitals and nursing note reviewed.   Constitutional:       General: She is not in acute distress.     Appearance: Normal appearance. She is not toxic-appearing.   HENT:      Head: Atraumatic.      Right Ear: External ear normal.      Left Ear: External ear normal.      Nose: Nose normal.      Mouth/Throat:      Mouth: Mucous membranes are moist.   Eyes:      General:         Right eye: No discharge.         Left eye: No discharge.      Extraocular Movements: Extraocular movements intact.      Pupils: Pupils are equal, round, and reactive to light.   Neck:      Comments: No carotid bruits  Cardiovascular:      Rate and Rhythm: Normal rate and regular rhythm.      Pulses: Normal pulses.      Heart sounds: Normal heart sounds. No murmur heard.     No gallop.      Comments: Heart tones normal, no ectopy, no S3.  Pulmonary:      Effort: Pulmonary effort is normal. No respiratory distress.      Breath sounds: No wheezing, rhonchi or rales.      Comments: Lung fields remain clear bilaterally.  Abdominal:      General: There is no distension.      Palpations: Abdomen is soft. There is no mass.      Tenderness: There is no abdominal tenderness. There is no guarding.   Musculoskeletal:         General: No swelling or tenderness.      Cervical back: No tenderness.      Right lower leg: No edema.      Left lower leg: No edema.      Comments: No peripheral edema.   Skin:     General: " Skin is warm and dry.      Findings: No rash.   Neurological:      General: No focal deficit present.      Mental Status: She is alert and oriented to person, place, and time. Mental status is at baseline.      Motor: No weakness.      Gait: Gait normal.   Psychiatric:         Mood and Affect: Mood normal.         Thought Content: Thought content normal.          Result Review   The following data was reviewed by: Juno Jerome MD on 04/27/2022:  [x] Laboratory  [] Microbiology  [] Radiology  [] EKG/telemetry  [] Cardiology/Vascular  [] Pathology  [x] Old records             Assessment and Plan   Diagnoses and all orders for this visit:    1. Medicare annual wellness visit, subsequent (Primary)  Assessment & Plan:  AWV completed 5/25.  Patient remains very active and independent.  She has not had any falls nor hospitalizations in the past year.  She does not have a living well per se, but she wishes remain a full code.           Orders:  -     CBC w AUTO Differential; Future  -     TSH+Free T4; Future  -     Vitamin B12; Future  -     Folate; Future  -     TSH+Free T4; Future  -     Renal Function Panel; Future    2. Mixed hyperlipidemia  Assessment & Plan:  Patient's LDL went up from 130 to over 200 in 1/25 after she came off of the low-dose statin.  She does not have significant family history of CAD, but discussed with her given that level, we should get a CT calcium score to ensure that she does not have any significant blockage already.  ---> We discussed this at her 5/25 OV, she actually feels more comfortable getting back on low-dose atorvastatin, and that is certainly reasonable, we will follow-up labs in about 6 months.           Orders:  -     Comprehensive metabolic panel; Future  -     Lipid panel; Future    3. Vitamin D deficiency  Assessment & Plan:  Will get level here shortly and make further recommendations.           Orders:  -     Vitamin D 25 hydroxy; Future  -     Vitamin D,25-Hydroxy;  Future    4. Impaired fasting glucose  Assessment & Plan:  This was mild as well, only 5.7, but we will repeat it since we are checking her thyroid again.    Orders:  -     Hemoglobin A1c; Future  -     Hemoglobin A1c; Future    5. Subclinical hypothyroidism  Assessment & Plan:  Patient's TSH was 4.5 in 1/25, she is asymptomatic this regards as of her 5/25 OV, but we obviously need to repeat the level since it was only 2.2 prior to that.      Other orders  -     atorvastatin (LIPITOR) 10 MG tablet; Take 1 tablet by mouth Daily.  Dispense: 90 tablet; Refill: 3  -     calcium carbonate (Tums) 500 MG chewable tablet; Chew 1 tablet 2 (Two) Times a Day.  Dispense: 180 tablet; Refill: 1  -     Cholecalciferol (Vitamin D3 Gummies) 25 MCG (1000 UT) chewable tablet; Chew 2,000 Int'l Units Daily.  Dispense: 180 tablet; Refill: 1              --  --  OLDER NOTES:  VISIT 6/21:  ANNUAL PHYSICAL 7/20 and d/w all labs in detail; no ischemic sx's at work/home.  --  HYPOTHYROIDISM is new as of 2/18 OV=no sxs, so will repeat...wnl as of 8/18 OV...fine 7/19 w/o meds...ditto 7/20---> wnl 6/21.  --  ANXIETY D/O = f/u on SSRI past 6 weeks or so, took off a week, daughter who is on disability moved out into [facility], no car/drivers license=they got custody of 7 yo grand-daughter, apparently this daughter has schizophrenia just like her son; so, stress is understandable, but does feel better on Celexa=calmer...pleasant---> no new concerns 6/21  WT LOSS of 4 lbs after down a few prior to above; no N/V/blood in stool; asked her to monitor at home...down 10 total to 114 and I d/w eat more please...stable at least--->120's holding 6/21.  --  LEUKOPENIA 1/17 is new and will repeat 6 mo...4.8 is better/fine, and has been lower prior actually; all lines wnl...4.1 with nl diff...4.5 is same 7/20, but need to keep eye on Lymphs---> wnl 6/21.  ANEMIA = 10.9 as of 7/19 OV, so will get more labs on RTO...better 7/20--->same 6/21 and not  "interested in c-scope.  --  LIPIDS.../HDL 90 so d/w tx on RTO if the same...152 and d/w stop the strict diet as above; ? tx if back up...130 fine...131 is holding/ HDL 70's...153 again and I rec tx as of 7/19 OV...92 is nice drop=repeat on RTO fall '20...133 and reports c/w, so needs 20 mg now---> 149 in 6/21 and says it's too expensive; so I printed out $12 coupon for 90 days.  --  MIGRANE H/A...stable...3 pills/month...less than that recently...about 4-5x/month is current/typical average...less at 11/19 OV, but she doesn't want any preventive meds (Had MRI per ENT 9/19 for hearing loss).  \"SPELLS\" per HPI, exam is benign, 1 spell at work, 1 when driving, ? panic attack and d/w call if recurrent...still no c/o presently.  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  --  VIT D DEF stable=33...25 and I d/w her 2/18---> 39 in 7/20 = RTO.  OSTEOPENIA...BMD 11/16...spine -1.3, hip -1.6...9/19 = spine -1.3, hip -1.7---> needed fall '21.  --  L KNEE PAIN 7/20 with neg xray per urgent care and I d/w eval per Ortho now since pain when she is on it; wear ace sleeve.  --   MMG 7/27/2022 per me.  COLON 12/22...polyp/FH+ = 5 yrs per Dr Delgado.  Adacel 8/18;   ( #2 at age 39, 1 girl RN in [place], other girl bipolar/schizo, and 1 son with paranoid schizo...they live here...raising 15 yo grand-daughter still as of 5/24 OV; Mom passed 60's with Lupus, F passed 97 in 9/23 and she cared for him with Hospice, he was farmer/preacher; S/B = no changes 11/23--->Patient retired in 2023).    Follow Up   Return in about 6 months (around 11/21/2025).  Patient was given instructions and counseling regarding her condition or for health maintenance advice. Please see specific information pulled " into the AVS if appropriate.

## 2025-05-21 NOTE — ASSESSMENT & PLAN NOTE
CHUYITA completed 5/25.  Patient remains very active and independent.  She has not had any falls nor hospitalizations in the past year.  She does not have a living well per se, but she wishes remain a full code.

## 2025-05-21 NOTE — ASSESSMENT & PLAN NOTE
We reviewed her BMD at her 5/25 OV, it is very stable over the past 5 years or more now.  Lowest BMD is -1.8 in the right femoral neck.  She will get back on calcium and vitamin D supplementation, but obviously no prescription treatment needed otherwise.

## 2025-05-23 ENCOUNTER — TELEPHONE (OUTPATIENT)
Age: 70
End: 2025-05-23
Payer: MEDICARE

## 2025-05-23 NOTE — TELEPHONE ENCOUNTER
Pt is trying to only get the May 28th orders done but the lab is telling her she has to get the fasting labs done at the same time because the date in the computer doesn't match the date on the AVS.  The lipid panel is for 11/17/2025.    #[phone]

## 2025-05-30 ENCOUNTER — CLINICAL SUPPORT (OUTPATIENT)
Age: 70
End: 2025-05-30
Payer: MEDICARE

## 2025-05-30 DIAGNOSIS — R73.01 IMPAIRED FASTING GLUCOSE: ICD-10-CM

## 2025-05-30 DIAGNOSIS — Z00.00 MEDICARE ANNUAL WELLNESS VISIT, SUBSEQUENT: ICD-10-CM

## 2025-05-30 DIAGNOSIS — E55.9 VITAMIN D DEFICIENCY: ICD-10-CM

## 2025-05-30 LAB
25(OH)D3 SERPL-MCNC: 36.4 NG/ML (ref 30–100)
ALBUMIN SERPL-MCNC: 4.6 G/DL (ref 3.5–5.2)
ANION GAP SERPL CALCULATED.3IONS-SCNC: 14 MMOL/L (ref 5–15)
BUN SERPL-MCNC: 18 MG/DL (ref 8–23)
BUN/CREAT SERPL: 19.8 (ref 7–25)
CALCIUM SPEC-SCNC: 9.5 MG/DL (ref 8.6–10.5)
CHLORIDE SERPL-SCNC: 100 MMOL/L (ref 98–107)
CO2 SERPL-SCNC: 22 MMOL/L (ref 22–29)
CREAT SERPL-MCNC: 0.91 MG/DL (ref 0.57–1)
EGFRCR SERPLBLD CKD-EPI 2021: 68 ML/MIN/1.73
GLUCOSE SERPL-MCNC: 79 MG/DL (ref 65–99)
HBA1C MFR BLD: 5.5 % (ref 4.8–5.6)
PHOSPHATE SERPL-MCNC: 3.3 MG/DL (ref 2.5–4.5)
POTASSIUM SERPL-SCNC: 4.4 MMOL/L (ref 3.5–5.2)
SODIUM SERPL-SCNC: 136 MMOL/L (ref 136–145)
T4 FREE SERPL-MCNC: 1.27 NG/DL (ref 0.92–1.68)
TSH SERPL DL<=0.05 MIU/L-ACNC: 2.83 UIU/ML (ref 0.27–4.2)

## 2025-05-30 PROCEDURE — 82306 VITAMIN D 25 HYDROXY: CPT | Performed by: INTERNAL MEDICINE

## 2025-05-30 PROCEDURE — 84443 ASSAY THYROID STIM HORMONE: CPT | Performed by: INTERNAL MEDICINE

## 2025-05-30 PROCEDURE — 36415 COLL VENOUS BLD VENIPUNCTURE: CPT | Performed by: INTERNAL MEDICINE

## 2025-05-30 PROCEDURE — 83036 HEMOGLOBIN GLYCOSYLATED A1C: CPT | Performed by: INTERNAL MEDICINE

## 2025-05-30 PROCEDURE — 84439 ASSAY OF FREE THYROXINE: CPT | Performed by: INTERNAL MEDICINE

## 2025-05-30 PROCEDURE — 80069 RENAL FUNCTION PANEL: CPT | Performed by: INTERNAL MEDICINE

## 2025-06-01 ENCOUNTER — RESULTS FOLLOW-UP (OUTPATIENT)
Age: 70
End: 2025-06-01
Payer: MEDICARE

## (undated) DEVICE — Device

## (undated) DEVICE — SOLIDIFIER LIQLOC PLS 1500CC BT

## (undated) DEVICE — SNAR POLYP CAPTIFLEX XS/OVL 11X2.4MM 240CM 1P/U

## (undated) DEVICE — CONN JET HYDRA H20 AUXILIARY DISP

## (undated) DEVICE — SOL IRRG H2O PL/BG 1000ML STRL

## (undated) DEVICE — LINER SURG CANSTR SXN S/RIGD 1500CC

## (undated) DEVICE — THE SINGLE USE ETRAP – POLYP TRAP IS USED FOR SUCTION RETRIEVAL OF ENDOSCOPICALLY REMOVED POLYPS.: Brand: ETRAP

## (undated) DEVICE — Device: Brand: DEFENDO AIR/WATER/SUCTION AND BIOPSY VALVE